# Patient Record
Sex: MALE | Race: ASIAN | NOT HISPANIC OR LATINO | Employment: FULL TIME | URBAN - METROPOLITAN AREA
[De-identification: names, ages, dates, MRNs, and addresses within clinical notes are randomized per-mention and may not be internally consistent; named-entity substitution may affect disease eponyms.]

---

## 2021-04-20 ENCOUNTER — OFFICE VISIT (OUTPATIENT)
Dept: INTERNAL MEDICINE CLINIC | Facility: CLINIC | Age: 71
End: 2021-04-20
Payer: COMMERCIAL

## 2021-04-20 VITALS
HEIGHT: 68 IN | SYSTOLIC BLOOD PRESSURE: 150 MMHG | WEIGHT: 142 LBS | HEART RATE: 80 BPM | TEMPERATURE: 98.6 F | DIASTOLIC BLOOD PRESSURE: 78 MMHG | BODY MASS INDEX: 21.52 KG/M2

## 2021-04-20 DIAGNOSIS — E11.9 TYPE 2 DIABETES MELLITUS WITHOUT COMPLICATION, WITHOUT LONG-TERM CURRENT USE OF INSULIN (HCC): Primary | ICD-10-CM

## 2021-04-20 DIAGNOSIS — E53.8 VITAMIN B12 DEFICIENCY: ICD-10-CM

## 2021-04-20 DIAGNOSIS — R13.10 DYSPHAGIA, UNSPECIFIED TYPE: ICD-10-CM

## 2021-04-20 DIAGNOSIS — E53.8 FOLIC ACID DEFICIENCY: ICD-10-CM

## 2021-04-20 DIAGNOSIS — I10 ESSENTIAL HYPERTENSION: ICD-10-CM

## 2021-04-20 DIAGNOSIS — E55.9 VITAMIN D DEFICIENCY: ICD-10-CM

## 2021-04-20 DIAGNOSIS — K63.5 POLYP OF SIGMOID COLON, UNSPECIFIED TYPE: ICD-10-CM

## 2021-04-20 DIAGNOSIS — Z12.5 SCREENING PSA (PROSTATE SPECIFIC ANTIGEN): ICD-10-CM

## 2021-04-20 PROBLEM — M83.9 VITAMIN D DEFICIENT OSTEOMALACIA: Status: ACTIVE | Noted: 2021-04-20

## 2021-04-20 PROCEDURE — 99214 OFFICE O/P EST MOD 30 MIN: CPT | Performed by: INTERNAL MEDICINE

## 2021-04-20 RX ORDER — CHOLECALCIFEROL (VITAMIN D3) 125 MCG
500 CAPSULE ORAL DAILY
COMMUNITY

## 2021-04-20 RX ORDER — MELATONIN
1000 DAILY
COMMUNITY

## 2021-04-20 RX ORDER — VITAMIN E 268 MG
400 CAPSULE ORAL DAILY
COMMUNITY
End: 2021-04-20 | Stop reason: CLARIF

## 2021-04-20 RX ORDER — MULTIVIT WITH MINERALS/LUTEIN
250 TABLET ORAL DAILY
COMMUNITY

## 2021-04-20 NOTE — ASSESSMENT & PLAN NOTE
Patient last blood test November 2018 and none since then  Patient has been watching diet  Not on any medication  Will check hemoglobin A1c and blood sugar

## 2021-04-20 NOTE — PROGRESS NOTES
Assessment/Plan:    1  Type 2 diabetes mellitus without complication, without long-term current use of insulin [facility])  Assessment & Plan:  Patient last blood test November 2018 and none since then  Patient has been watching diet  Not on any medication  Will check hemoglobin A1c and blood sugar  Orders:  -     Comprehensive metabolic panel; Future  -     Lipid panel; Future  -     Hemoglobin A1C; Future  -     TSH, 3rd generation; Future  -     UA (URINE) with reflex to Scope  -     Microalbumin / creatinine urine ratio    2  Polyp of sigmoid colon, unspecified type  Assessment & Plan:  Last colonoscopy was done March 2012 by Dr Piter Jefferson, was advised follow-up in 5 years    Orders:  -     Ambulatory referral to Gastroenterology; Future    3  Vitamin D deficiency  Comments:  His on vitamin-D supplement  Will check vitamin-D level  Orders:  -     Vitamin D 25 hydroxy; Future    4  Folic acid deficiency  Assessment & Plan:  Patient is not taking folic acid supplement  Will check folic acid level  And advise accordingly  Orders:  -     CBC and differential; Future  -     Folate; Future    5  Vitamin B12 deficiency  Assessment & Plan:  His vitamin B12 level in the past was 176  Patient has been taking vitamin-D is 12 supplement  Will check level and advise accordingly  6  Dysphagia, unspecified type  Assessment & Plan:  Patient feels sometime when he eats food does not go down easily and like it stucks below throat area, although he denies any reflux  Will refer to Gastroenterology for EGD  Orders:  -     Ambulatory referral to Gastroenterology; Future    7  BMI 21 0-21 9, adult    8  Essential hypertension  Assessment & Plan:  Patient is not on any medication  Will obtain blood tests and then decide medication for his high blood pressure  Meanwhile advised for low-salt diet  Orders:  -     CBC and differential; Future  -     Comprehensive metabolic panel; Future  -     Lipid panel;  Future  - TSH, 3rd generation; Future  -     UA (URINE) with reflex to Scope    9  Screening PSA (prostate specific antigen)  -     PSA, Total Screen; Future          Subjective:  Patient presents for follow-up  Patient ID: Meredith Hernández is a 79 y o  male  HPI   77-year-old male patient presents for follow-up of his medical problems  Last time he was seen by me at office Nov /2018  He did not have any blood test since then  Denies any chest pain, shortness with, pain abdomen  Denies any cough, fever, chills  Denies any nausea, vomiting, diarrhea  Denies any blood in the stool or dark colored stool  Complain of sometime food stuck in the throat area and does not go down easily per patient  Denies any GE reflux or heartburn  He has not take any medication for heartburn or reflux  Patient got COVID-19 vaccination  The following portions of the patient's history were reviewed and updated as appropriate:     Past Medical History:  He has a past medical history of BMI 21 0-21 9, adult (4/20/2021), Dysphagia (4/15/9128), Folic acid deficiency (4/20/2021), HTN (hypertension) (4/20/2021), Polyp of sigmoid colon (4/20/2021), Type 2 diabetes mellitus without complication, without long-term current use of insulin ([address] ) (4/20/2021), Vitamin B12 deficiency (4/20/2021), and 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)  ,  _______________________________________________________________________  Past Surgical History:   has no past surgical history on file ,  _______________________________________________________________________  Family History:  family history is not on file ,  _______________________________________________________________________  Social History:   reports that he has never smoked   He has never used smokeless tobacco  No history on file for alcohol and drug ,  _______________________________________________________________________  Allergies:  has No Known Allergies     _______________________________________________________________________  Current Outpatient Medications   Medication Sig Dispense Refill    ascorbic acid (VITAMIN C) 250 mg tablet Take 250 mg by mouth daily      cholecalciferol (VITAMIN D3) 1,000 units tablet Take 1,000 Units by mouth daily      vitamin B-12 (VITAMIN B-12) 500 mcg tablet Take 500 mcg by mouth daily       No current facility-administered medications for this visit       _______________________________________________________________________  Review of Systems   Constitutional: Negative for chills, fatigue and fever  HENT: Negative for congestion, ear pain, hearing loss, nosebleeds, sinus pain, sore throat and trouble swallowing  Eyes: Negative for discharge, redness and visual disturbance  Respiratory: Negative for cough, chest tightness and shortness of breath  Cardiovascular: Negative for chest pain and palpitations  Gastrointestinal: Negative for abdominal pain, blood in stool, constipation, diarrhea, nausea and vomiting  Genitourinary: Negative for dysuria, flank pain, frequency and hematuria  Musculoskeletal: Negative for arthralgias, myalgias and neck pain  Skin: Negative for color change and rash  Neurological: Negative for dizziness, speech difficulty, weakness and headaches  Hematological: Does not bruise/bleed easily  Psychiatric/Behavioral: Negative for agitation and behavioral problems  Objective:  Vitals:    04/20/21 1641   BP: 150/78   Pulse: 80   Temp: 98 6 °F (37 °C)   Weight: 64 4 kg (142 lb)   Height: 5' 8" (1 727 m)     Body mass index is 21 59 kg/m²  Physical Exam  Vitals signs and nursing note reviewed  Constitutional:       General: He is not in acute distress  Appearance: Normal appearance  HENT:      Head: Normocephalic and atraumatic        Right Ear: Ear canal and external ear normal       Left Ear: Ear canal and external ear normal       Nose: Nose normal  Mouth/Throat:      Mouth: Mucous membranes are moist       Pharynx: Oropharynx is clear  Comments: Patient has a face mask on  Eyes:      General: No scleral icterus  Extraocular Movements: Extraocular movements intact  Conjunctiva/sclera: Conjunctivae normal    Neck:      Musculoskeletal: Normal range of motion and neck supple  No muscular tenderness  Cardiovascular:      Rate and Rhythm: Normal rate and regular rhythm  Pulses: Normal pulses  no weak pulses          Dorsalis pedis pulses are 2+ on the right side and 2+ on the left side  Posterior tibial pulses are 2+ on the right side and 2+ on the left side  Heart sounds: No murmur  Pulmonary:      Effort: Pulmonary effort is normal       Breath sounds: Normal breath sounds  Abdominal:      General: Bowel sounds are normal       Palpations: Abdomen is soft  Tenderness: There is no abdominal tenderness  Genitourinary:     Comments: Patient declined for rectal examination  Musculoskeletal: Normal range of motion  Right lower leg: No edema  Left lower leg: No edema  Feet:      Right foot:      Skin integrity: No ulcer, skin breakdown, erythema, warmth, callus or dry skin  Left foot:      Skin integrity: No ulcer, skin breakdown, erythema, warmth, callus or dry skin  Skin:     General: Skin is warm  Findings: No rash  Neurological:      General: No focal deficit present  Mental Status: He is alert and oriented to person, place, and time  Psychiatric:         Mood and Affect: Mood normal          Behavior: Behavior normal        Right Foot/Ankle   Right Foot Inspection  Skin Exam: skin normal and skin intact no dry skin, no warmth, no callus, no erythema, no maceration, no abnormal color, no pre-ulcer, no ulcer and no callus                            Sensory   Vibration: intact  Proprioception: intact   Monofilament testing: intact  Vascular    The right DP pulse is 2+   The right PT pulse is 2+      Left Foot/Ankle  Left Foot Inspection  Skin Exam: skin normal and skin intactno dry skin, no warmth, no erythema, no maceration, normal color, no pre-ulcer, no ulcer and no callus                                         Sensory   Vibration: intact  Proprioception: intact  Monofilament: intact  Vascular    The left DP pulse is 2+  The left PT pulse is 2+  Assign Risk Category:  No deformity present; No loss of protective sensation; No weak pulses       Risk: 0  I spent 30 minutes with the patient today    More than 50% time spent for reviewing of external notes, reviewing of the results of diagnostics test, management of care, patient education and ordering of test

## 2021-04-20 NOTE — PATIENT INSTRUCTIONS
Patient advised to continue present medications discussed with the patient medications and laboratory data in detail  Follow-up with me in 3 months    If any blood test was ordered please do 1 week prior to next appointment  If you have any questions please call the office [phone]

## 2021-04-21 NOTE — ASSESSMENT & PLAN NOTE
Patient feels sometime when he eats food does not go down easily and like it stucks below throat area, although he denies any reflux  Will refer to Gastroenterology for EGD

## 2021-04-21 NOTE — ASSESSMENT & PLAN NOTE
His vitamin B12 level in the past was 176  Patient has been taking vitamin-D is 12 supplement  Will check level and advise accordingly

## 2021-04-21 NOTE — ASSESSMENT & PLAN NOTE
Patient is not on any medication  Will obtain blood tests and then decide medication for his high blood pressure  Meanwhile advised for low-salt diet

## 2021-04-26 LAB
25(OH)D3 SERPL-MCNC: 61 NG/ML (ref 30–100)
ALBUMIN SERPL-MCNC: 4 G/DL (ref 3.6–5.1)
ALBUMIN/CREAT UR: 12 MCG/MG CREAT
ALBUMIN/GLOB SERPL: 1.1 (CALC) (ref 1–2.5)
ALP SERPL-CCNC: 71 U/L (ref 35–144)
ALT SERPL-CCNC: 7 U/L (ref 9–46)
AST SERPL-CCNC: 15 U/L (ref 10–35)
BASOPHILS # BLD AUTO: 41 CELLS/UL (ref 0–200)
BASOPHILS NFR BLD AUTO: 0.7 %
BILIRUB SERPL-MCNC: 0.9 MG/DL (ref 0.2–1.2)
BUN SERPL-MCNC: 16 MG/DL (ref 7–25)
BUN/CREAT SERPL: ABNORMAL (CALC) (ref 6–22)
CALCIUM SERPL-MCNC: 8.9 MG/DL (ref 8.6–10.3)
CHLORIDE SERPL-SCNC: 106 MMOL/L (ref 98–110)
CHOLEST SERPL-MCNC: 144 MG/DL
CHOLEST/HDLC SERPL: 2.6 (CALC)
CO2 SERPL-SCNC: 26 MMOL/L (ref 20–32)
CREAT SERPL-MCNC: 0.81 MG/DL (ref 0.7–1.18)
CREAT UR-MCNC: 26 MG/DL (ref 20–320)
EOSINOPHIL # BLD AUTO: 71 CELLS/UL (ref 15–500)
EOSINOPHIL NFR BLD AUTO: 1.2 %
ERYTHROCYTE [DISTWIDTH] IN BLOOD BY AUTOMATED COUNT: 12.7 % (ref 11–15)
FOLATE SERPL-MCNC: 5.8 NG/ML
GLOBULIN SER CALC-MCNC: 3.8 G/DL (CALC) (ref 1.9–3.7)
GLUCOSE SERPL-MCNC: 106 MG/DL (ref 65–99)
HBA1C MFR BLD: 6.2 % OF TOTAL HGB
HCT VFR BLD AUTO: 39 % (ref 38.5–50)
HDLC SERPL-MCNC: 56 MG/DL
HGB BLD-MCNC: 12.4 G/DL (ref 13.2–17.1)
LDLC SERPL CALC-MCNC: 76 MG/DL (CALC)
LYMPHOCYTES # BLD AUTO: 2095 CELLS/UL (ref 850–3900)
LYMPHOCYTES NFR BLD AUTO: 35.5 %
MCH RBC QN AUTO: 25.5 PG (ref 27–33)
MCHC RBC AUTO-ENTMCNC: 31.8 G/DL (ref 32–36)
MCV RBC AUTO: 80.2 FL (ref 80–100)
MICROALBUMIN UR-MCNC: 0.3 MG/DL
MONOCYTES # BLD AUTO: 443 CELLS/UL (ref 200–950)
MONOCYTES NFR BLD AUTO: 7.5 %
NEUTROPHILS # BLD AUTO: 3251 CELLS/UL (ref 1500–7800)
NEUTROPHILS NFR BLD AUTO: 55.1 %
NONHDLC SERPL-MCNC: 88 MG/DL (CALC)
PLATELET # BLD AUTO: 192 THOUSAND/UL (ref 140–400)
PMV BLD REES-ECKER: 11.4 FL (ref 7.5–12.5)
POTASSIUM SERPL-SCNC: 4.5 MMOL/L (ref 3.5–5.3)
PROT SERPL-MCNC: 7.8 G/DL (ref 6.1–8.1)
PSA SERPL-MCNC: 5.4 NG/ML
RBC # BLD AUTO: 4.86 MILLION/UL (ref 4.2–5.8)
SL AMB EGFR AFRICAN AMERICAN: 104 ML/MIN/1.73M2
SL AMB EGFR NON AFRICAN AMERICAN: 90 ML/MIN/1.73M2
SODIUM SERPL-SCNC: 138 MMOL/L (ref 135–146)
TRIGL SERPL-MCNC: 50 MG/DL
TSH SERPL-ACNC: 2.58 MIU/L (ref 0.4–4.5)
WBC # BLD AUTO: 5.9 THOUSAND/UL (ref 3.8–10.8)

## 2021-10-25 ENCOUNTER — NEW REFERRAL (OUTPATIENT)
Dept: URBAN - METROPOLITAN AREA CLINIC 6 | Facility: CLINIC | Age: 71
End: 2021-10-25

## 2021-10-25 DIAGNOSIS — H40.1131: ICD-10-CM

## 2021-10-25 PROCEDURE — 92133 CPTRZD OPH DX IMG PST SGM ON: CPT

## 2021-10-25 PROCEDURE — 92020 GONIOSCOPY: CPT

## 2021-10-25 PROCEDURE — 1036F TOBACCO NON-USER: CPT

## 2021-10-25 PROCEDURE — G8428 CUR MEDS NOT DOCUMENT: HCPCS

## 2021-10-25 PROCEDURE — 92202 OPSCPY EXTND ON/MAC DRAW: CPT

## 2021-10-25 PROCEDURE — 92004 COMPRE OPH EXAM NEW PT 1/>: CPT

## 2021-10-25 PROCEDURE — 76514 ECHO EXAM OF EYE THICKNESS: CPT

## 2021-10-25 ASSESSMENT — VISUAL ACUITY
OU_SC: J1
OS_SC: 20/40-2
OD_SC: 20/40-2

## 2021-10-25 ASSESSMENT — TONOMETRY
OD_IOP_MMHG: 30
OS_IOP_MMHG: 25

## 2021-11-22 ENCOUNTER — IOP CHECK (OUTPATIENT)
Dept: URBAN - METROPOLITAN AREA CLINIC 6 | Facility: CLINIC | Age: 71
End: 2021-11-22

## 2021-11-22 DIAGNOSIS — H40.1131: ICD-10-CM

## 2021-11-22 PROCEDURE — 1036F TOBACCO NON-USER: CPT

## 2021-11-22 PROCEDURE — G8427 DOCREV CUR MEDS BY ELIG CLIN: HCPCS

## 2021-11-22 PROCEDURE — 92012 INTRM OPH EXAM EST PATIENT: CPT

## 2021-11-22 ASSESSMENT — VISUAL ACUITY
OS_SC: 20/40-2
OD_SC: 20/30-1

## 2021-11-22 ASSESSMENT — TONOMETRY
OS_IOP_MMHG: 23
OD_IOP_MMHG: 15

## 2022-04-18 ENCOUNTER — DOCUMENTATION (OUTPATIENT)
Dept: INTERNAL MEDICINE CLINIC | Facility: CLINIC | Age: 72
End: 2022-04-18

## 2022-04-18 NOTE — PROGRESS NOTES
Discussed with the patient that he was advised to see an urologist April 2021 due to elevated PSA but patient state he did go to see urologist yet  Has elevated blood sugar for which he has been watching diet  Patient advised to see me at office for yearly follow-up and will repeat yearly blood test including PSA

## 2022-06-07 ENCOUNTER — TELEPHONE (OUTPATIENT)
Dept: INTERNAL MEDICINE CLINIC | Facility: CLINIC | Age: 72
End: 2022-06-07

## 2022-06-07 ENCOUNTER — OFFICE VISIT (OUTPATIENT)
Dept: URGENT CARE | Facility: CLINIC | Age: 72
End: 2022-06-07
Payer: COMMERCIAL

## 2022-06-07 ENCOUNTER — APPOINTMENT (OUTPATIENT)
Dept: RADIOLOGY | Facility: CLINIC | Age: 72
End: 2022-06-07
Payer: COMMERCIAL

## 2022-06-07 VITALS
WEIGHT: 149 LBS | HEART RATE: 72 BPM | DIASTOLIC BLOOD PRESSURE: 82 MMHG | SYSTOLIC BLOOD PRESSURE: 175 MMHG | TEMPERATURE: 97.4 F | HEIGHT: 68 IN | BODY MASS INDEX: 22.58 KG/M2 | RESPIRATION RATE: 16 BRPM | OXYGEN SATURATION: 100 %

## 2022-06-07 DIAGNOSIS — D22.9 ATYPICAL NEVI: ICD-10-CM

## 2022-06-07 DIAGNOSIS — S40.022A ARM BRUISE, LEFT, INITIAL ENCOUNTER: ICD-10-CM

## 2022-06-07 DIAGNOSIS — S40.022A ARM BRUISE, LEFT, INITIAL ENCOUNTER: Primary | ICD-10-CM

## 2022-06-07 PROCEDURE — 99213 OFFICE O/P EST LOW 20 MIN: CPT | Performed by: NURSE PRACTITIONER

## 2022-06-07 PROCEDURE — 73060 X-RAY EXAM OF HUMERUS: CPT

## 2022-06-07 NOTE — TELEPHONE ENCOUNTER
He can schedule to see me in 1-2 weeks depending on schedule opening    Unless emergency can go to urgent care

## 2022-06-07 NOTE — TELEPHONE ENCOUNTER
Emma called to make apt with you - Erica Munroe had a metal plate put in his hand 10 yrs ago and now there seems to be a growth beginning over it? Just started a couple days ago  They want you to look at it  Do you want to fit him in? I asked who put the plate in and she said it was some dr in [facility] 10 yrs ago  Maybe the inf is in his chart?

## 2022-06-07 NOTE — PROGRESS NOTES
[email or web address]        NAME: Boubacar Hicks is a 67 y o  male  : 1950    MRN: [id number]  DATE: 2022  TIME: 4:20 PM    Assessment and Plan   Arm bruise, left, initial encounter [S40 022A]  1  Arm bruise, left, initial encounter  Ambulatory referral to Orthopedic Surgery    XR humerus left   2  Atypical nevi  Ambulatory referral to Dermatology     --Suspected partial tear of biceps muscle/tendon  No obvious deformity noted on exam, however  Emphasized rest, ice, other measures per below  --Emphasized importance of getting mole on back looked at ASAP  Referral information given  Patient Instructions     --Initial read of x-ray without acute findings  Intact metal plate in elbow  Will call with final radiology results when obtained (anticipate 1-12 hours)  --Rest, avoid lifting > 10 pounds for at least the next week  --Ice 3-4 times a day for the next 2-3 days, then heat  --OTC Advil as needed for pain  Alternative it OTC Voltaren gel  --Follow-up with ortho for ongoing symptoms over the next week  Go to ER for any worsening bruising, pain in the interim    --Follow-up with PCP/dermatologist regarding large black mole on back ASAP    Chief Complaint     Chief Complaint   Patient presents with    Arm Pain     Pt  C/o left upper arm pain x yesterday, large bruise on arm, no injury known  History of Present Illness       Here with complaints of left upper arm pain and bruising  First noticed yesterday  No known precipitating injury or activity, although he does lift 30-40 pound crates of beverages regularly  Pain mainly in proximal biceps region  Radiates to posterior shoulder  Pain increased with raising arm overhead  Mild weakness  No ice/heat, analgesics taken  No blood thinners  Notes past history of "plate" in left upper arm as the result of fracture (10 years ago)           Review of Systems   Review of Systems   Musculoskeletal:        Per HPI Neurological: Positive for weakness  Negative for numbness  Current Medications       Current Outpatient Medications:     ascorbic acid (VITAMIN C) 250 mg tablet, Take 250 mg by mouth daily, Disp: , Rfl:     cholecalciferol (VITAMIN D3) 1,000 units tablet, Take 1,000 Units by mouth daily, Disp: , Rfl:     vitamin B-12 (VITAMIN B-12) 500 mcg tablet, Take 500 mcg by mouth daily, Disp: , Rfl:     Current Allergies     Allergies as of 06/07/2022    (No Known Allergies)            The following portions of the patient's history were reviewed and updated as appropriate: allergies, current medications, past family history, past medical history, past social history, past surgical history and problem list      Past Medical History:   Diagnosis Date    BMI 21 0-21 9, adult 4/20/2021    Dysphagia 5/51/9445    Folic acid deficiency 5/00/9576    HTN (hypertension) 4/20/2021    Polyp of sigmoid colon 4/20/2021    Type 2 diabetes mellitus without complication, without long-term current use of insulin ([facility] [address] ) 4/20/2021    Vitamin B12 deficiency 4/20/2021    Vitamin D deficient osteomalacia 4/20/2021       Past Surgical History:   Procedure Laterality Date    COLONOSCOPY  03/13/2012    by Dr Fernie Wilson; advise for f/u colonoscopy       Family History   Problem Relation Age of Onset    Hypertension Mother     Diabetes Mother     Hypertension Father          Medications have been verified  Objective   BP (!) 175/82   Pulse 72   Temp (!) 97 4 °F (36 3 °C)   Resp 16   Ht 5' 8" (1 727 m)   Wt 67 6 kg (149 lb)   SpO2 100%   BMI 22 66 kg/m²   No LMP for male patient  Physical Exam     Physical Exam  Musculoskeletal:         General: Swelling and tenderness present  Normal range of motion  Comments: Left proximal biceps region with 6 inch long area of ecchymosis, with localized area of tenderness/firmness overlying biceps tendon/proximal musculature  No readily apparent deformity noted  Remainder of left arm, shoulder non-tender with normal appearance  Normal left shoulder and elbow AROM with some pain at limits of elbow flexion and shoulder abduction  5/5 biceps, supraspinatus, subscapularis strength  Negative Empty Can  Skin:     Findings: Lesion present  Comments: Left mid back with 1 cm diameter, fairly well circumcised, uniformly colored black nevus  Neurological:      Mental Status: He is alert

## 2022-06-07 NOTE — PATIENT INSTRUCTIONS
--Initial read of x-ray without acute findings  Intact metal plate in elbow  Will call with final radiology results when obtained (anticipate 1-12 hours)  --Rest, avoid lifting > 10 pounds for at least the next week  --Ice 3-4 times a day for the next 2-3 days, then heat  --OTC Advil as needed for pain  Alternative it OTC Voltaren gel  --Follow-up with ortho for ongoing symptoms over the next week    Go to ER for any worsening bruising, pain in the interim    --Follow-up with PCP/dermatologist regarding large black mole on back

## 2022-07-21 ENCOUNTER — IOP CHECK (OUTPATIENT)
Dept: URBAN - METROPOLITAN AREA CLINIC 6 | Facility: CLINIC | Age: 72
End: 2022-07-21

## 2022-07-21 DIAGNOSIS — H40.1131: ICD-10-CM

## 2022-07-21 DIAGNOSIS — Z96.1: ICD-10-CM

## 2022-07-21 PROCEDURE — 92083 EXTENDED VISUAL FIELD XM: CPT

## 2022-07-21 PROCEDURE — 92250 FUNDUS PHOTOGRAPHY W/I&R: CPT

## 2022-07-21 PROCEDURE — 92012 INTRM OPH EXAM EST PATIENT: CPT

## 2022-07-21 ASSESSMENT — VISUAL ACUITY
OD_PH: 20/40-1
OS_SC: 20/50+1
OS_PH: 20/30
OD_SC: 20/50+1
OU_SC: J5

## 2022-07-21 ASSESSMENT — TONOMETRY
OD_IOP_MMHG: 22
OS_IOP_MMHG: 17

## 2022-11-17 ENCOUNTER — VBI (OUTPATIENT)
Dept: ADMINISTRATIVE | Facility: OTHER | Age: 72
End: 2022-11-17

## 2023-01-23 ENCOUNTER — ESTABLISHED COMPREHENSIVE EXAM (OUTPATIENT)
Dept: URBAN - METROPOLITAN AREA CLINIC 6 | Facility: CLINIC | Age: 73
End: 2023-01-23

## 2023-01-23 DIAGNOSIS — Z96.1: ICD-10-CM

## 2023-01-23 DIAGNOSIS — R73.09: ICD-10-CM

## 2023-01-23 DIAGNOSIS — H40.1131: ICD-10-CM

## 2023-01-23 PROCEDURE — 92020 GONIOSCOPY: CPT

## 2023-01-23 PROCEDURE — 92202 OPSCPY EXTND ON/MAC DRAW: CPT

## 2023-01-23 PROCEDURE — 92133 CPTRZD OPH DX IMG PST SGM ON: CPT

## 2023-01-23 PROCEDURE — 92014 COMPRE OPH EXAM EST PT 1/>: CPT

## 2023-01-23 ASSESSMENT — TONOMETRY
OS_IOP_MMHG: 17
OD_IOP_MMHG: 15

## 2023-01-23 ASSESSMENT — VISUAL ACUITY
OU_SC: J2-2
OD_SC: 20/30-2
OS_SC: 20/30-2

## 2024-08-07 ENCOUNTER — OFFICE VISIT (OUTPATIENT)
Dept: INTERNAL MEDICINE CLINIC | Facility: CLINIC | Age: 74
End: 2024-08-07
Payer: COMMERCIAL

## 2024-08-07 VITALS
OXYGEN SATURATION: 99 % | HEIGHT: 68 IN | HEART RATE: 67 BPM | WEIGHT: 148 LBS | RESPIRATION RATE: 18 BRPM | DIASTOLIC BLOOD PRESSURE: 80 MMHG | SYSTOLIC BLOOD PRESSURE: 158 MMHG | BODY MASS INDEX: 22.43 KG/M2 | TEMPERATURE: 98.2 F

## 2024-08-07 DIAGNOSIS — K63.5 POLYP OF SIGMOID COLON, UNSPECIFIED TYPE: ICD-10-CM

## 2024-08-07 DIAGNOSIS — E55.9 VITAMIN D DEFICIENCY: ICD-10-CM

## 2024-08-07 DIAGNOSIS — R13.19 OTHER DYSPHAGIA: ICD-10-CM

## 2024-08-07 DIAGNOSIS — Z12.5 SCREENING PSA (PROSTATE SPECIFIC ANTIGEN): ICD-10-CM

## 2024-08-07 DIAGNOSIS — E11.9 TYPE 2 DIABETES MELLITUS WITHOUT COMPLICATION, WITHOUT LONG-TERM CURRENT USE OF INSULIN (HCC): ICD-10-CM

## 2024-08-07 DIAGNOSIS — E53.8 FOLIC ACID DEFICIENCY: ICD-10-CM

## 2024-08-07 DIAGNOSIS — K21.9 LARYNGOPHARYNGEAL REFLUX (LPR): ICD-10-CM

## 2024-08-07 DIAGNOSIS — R97.20 ELEVATED PSA: ICD-10-CM

## 2024-08-07 DIAGNOSIS — D53.9 DEFICIENCY ANEMIA: ICD-10-CM

## 2024-08-07 DIAGNOSIS — E53.8 VITAMIN B12 DEFICIENCY: ICD-10-CM

## 2024-08-07 DIAGNOSIS — I10 PRIMARY HYPERTENSION: ICD-10-CM

## 2024-08-07 DIAGNOSIS — Z00.00 MEDICARE ANNUAL WELLNESS VISIT, SUBSEQUENT: Primary | ICD-10-CM

## 2024-08-07 PROBLEM — M83.9 VITAMIN D DEFICIENT OSTEOMALACIA: Status: RESOLVED | Noted: 2021-04-20 | Resolved: 2024-08-07

## 2024-08-07 PROCEDURE — G0439 PPPS, SUBSEQ VISIT: HCPCS | Performed by: INTERNAL MEDICINE

## 2024-08-07 PROCEDURE — 99214 OFFICE O/P EST MOD 30 MIN: CPT | Performed by: INTERNAL MEDICINE

## 2024-08-07 RX ORDER — LOSARTAN POTASSIUM 50 MG/1
50 TABLET ORAL DAILY
Qty: 30 TABLET | Refills: 1 | Status: SHIPPED | OUTPATIENT
Start: 2024-08-07

## 2024-08-07 RX ORDER — OMEPRAZOLE 40 MG/1
40 CAPSULE, DELAYED RELEASE ORAL
Qty: 30 CAPSULE | Refills: 2 | Status: SHIPPED | OUTPATIENT
Start: 2024-08-07 | End: 2025-02-03

## 2024-08-07 NOTE — PROGRESS NOTES
Ambulatory Visit  Name: Marin Blancas      : 1950      MRN: [id number]  Encounter Provider: Jimi Huertas MD  Encounter Date: 2024   Encounter department: [facility] INTERNAL MEDICINE    Assessment & Plan   1. Medicare annual wellness visit, subsequent  -     Lipid panel; Future  2. Primary hypertension  -     losartan (COZAAR) 50 mg tablet; Take 1 tablet (50 mg total) by mouth daily  -     CBC and differential; Future  -     Comprehensive metabolic panel; Future  -     UA (URINE) with reflex to Scope; Future  -     TSH, 3rd generation; Future  -     Lipid panel; Future  3. Other dysphagia  -     omeprazole (PriLOSEC) 40 MG capsule; Take 1 capsule (40 mg total) by mouth daily before breakfast  -     Ambulatory Referral to Gastroenterology; Future  -     Comprehensive metabolic panel; Future  4. Polyp of sigmoid colon, unspecified type  -     Ambulatory Referral to Gastroenterology; Future  5. Laryngopharyngeal reflux (LPR)  -     omeprazole (PriLOSEC) 40 MG capsule; Take 1 capsule (40 mg total) by mouth daily before breakfast  -     Ambulatory Referral to Gastroenterology; Future  6. Vitamin B12 deficiency  -     CBC and differential; Future  -     Vitamin B12; Future  7. Vitamin D deficiency  -     Comprehensive metabolic panel; Future  -     Vitamin D 25 hydroxy; Future  8. Folic acid deficiency  -     CBC and differential; Future  -     Folate; Future  9. Type 2 diabetes mellitus without complication, without long-term current use of insulin (HCC)  -     Albumin / creatinine urine ratio; Future  -     Comprehensive metabolic panel; Future  -     Hemoglobin A1C; Future  -     Lipid panel; Future  10. Elevated PSA  -     PSA, total and free; Future  -     Ambulatory Referral to Urology; Future  11. Deficiency anemia  -     CBC and differential; Future  -     Folate; Future  -     Vitamin B12; Future  -     UA (URINE) with reflex to Scope; Future  -     TSH, 3rd generation;  Future  -     Iron Panel (Includes Ferritin, Iron Sat%, Iron, and TIBC); Future  -     Protein electrophoresis, serum; Future  12. Screening PSA (prostate specific antigen)  -     PSA, total and free; Future  13. BMI 22.0-22.9, adult     Discussion/summary/plan:    Blood pressure elevated.  Discussed with the patient about starting medication for hypertension he agreed/started on losartan 50 mg daily.  Advised for low-salt diet.  He has a some difficulty in swallowing the food and some time medicine tablet.  He was seen by ENT specialist was diagnosed with laryngal pharyngeal reflux and was started omeprazole 20 mg daily which she took for 1 month.  Slightly improved but still has a persistent symptom.  Discussed with the patient needs EGD patient agreed so will refer to GI meanwhile I increased dose upon the result 20 to 40 mg daily.  Advised for GE reflux precautions as well as diet.  He has a vitamin B12 deficiency for he takes vitamin B12 supplement will follow vitamin B12 every day and will advise accordingly.  He has a vitamin D deficiency he takes vitamin D supplement.  Will follow vitamin D level and advise accordingly.  He has a history of folic acid deficiency in the past we will check folic acid level and advise accordingly.  He has history of diabetes mellitus diet controlled.  Last hemoglobin A1c 6.2.  He has been watching diet very closely.  Will follow A1c and advise accordingly.  He had elevated PSA last time 5.4 he was advised to see an urologist but he did not go to see urologist.  Discussed with the patient getting a rectal examination but he will consider having it done by specialist.  Will refer to urologist.  Will check total and free PSA.  His last hemoglobin was 12.4.  He denies any blood in the stool or dark-colored stool.  Will check anemia workup and as mentioned above will refer to EGD and also he needs colonoscopy patient agreed for colonoscopy as well so will refer to colonoscopy.  Has  history of colon polyp.  Preventive health issues were discussed with patient, and age appropriate screening tests were ordered as noted in patient's After Visit Summary. Personalized health advice and appropriate referrals for health education or preventive services given if needed, as noted in patient's After Visit Summary.          HPI   74-year-old male patient presents for annual wellness exam as well as follow-up his medical problems.    Patient Care Team:  Jimi Huertas MD as PCP - [facility] ([facility])    Review of Systems   Constitutional:  Negative for chills, fatigue and fever.   HENT:  Negative for congestion, ear pain, hearing loss, nosebleeds, sinus pain, sore throat and trouble swallowing.    Eyes:  Negative for discharge, redness and visual disturbance.   Respiratory:  Negative for cough, chest tightness and shortness of breath.    Cardiovascular:  Negative for chest pain and palpitations.   Gastrointestinal:  Negative for abdominal pain, blood in stool, constipation, diarrhea, nausea and vomiting.   Genitourinary:  Negative for dysuria, flank pain, frequency and hematuria.   Musculoskeletal:  Negative for arthralgias, myalgias and neck pain.   Skin:  Negative for color change and rash.   Neurological:  Negative for dizziness, speech difficulty, weakness and headaches.   Hematological:  Does not bruise/bleed easily.   Psychiatric/Behavioral:  Negative for agitation and behavioral problems.        Medical History Reviewed by provider this encounter:  Tobacco       Annual Wellness Visit Questionnaire   Marin is here for his Subsequent Wellness visit. Last Medicare Wellness visit information reviewed, patient interviewed and updates made to the record today.      Health Risk Assessment:   Patient rates overall health as excellent. Patient feels that their physical health rating is much better. Patient is satisfied with their life. Eyesight was rated as same. Hearing was rated  as same. Patient feels that their emotional and mental health rating is slightly better. Patients states they are never, rarely angry. Patient states they are never, rarely unusually tired/fatigued. Pain experienced in the last 7 days has been none. Patient states that he has experienced no weight loss or gain in last 6 months.     Depression Screening:   PHQ-2 Score: 0      Fall Risk Screening:   In the past year, patient has experienced: no history of falling in past year      Home Safety:  Patient does not have trouble with stairs inside or outside of their home. Patient has working smoke alarms and has no working carbon monoxide detector. Home safety hazards include: none.     Nutrition:   Current diet is Regular and Frequent junk food.     Medications:   Patient is currently taking over-the-counter supplements. OTC medications include: see medication list. Patient is able to manage medications.     Activities of Daily Living (ADLs)/Instrumental Activities of Daily Living (IADLs):   Walk and transfer into and out of bed and chair?: Yes  Dress and groom yourself?: Yes    Bathe or shower yourself?: Yes    Feed yourself? Yes  Do your laundry/housekeeping?: Yes  Manage your money, pay your bills and track your expenses?: Yes  Make your own meals?: Yes    Do your own shopping?: Yes    Previous Hospitalizations:   Any hospitalizations or ED visits within the last 12 months?: No      Advance Care Planning:   Living will: No    Durable POA for healthcare: No    Advanced directive: No    Advanced directive counseling given: Yes    ACP document given: Yes    Patient declined ACP directive: No      Cognitive Screening:   Provider or family/friend/caregiver concerned regarding cognition?: No    PREVENTIVE SCREENINGS      Cardiovascular Screening:    General: Screening Current    Due for: Lipid Panel      Diabetes Screening:     General: History Diabetes and Screening Current    Due for: Blood Glucose      Colorectal Cancer  Screening:       Due for: Colonoscopy - Low Risk      Prostate Cancer Screening:      Due for: PSA      Osteoporosis Screening:    General: Risks and Benefits Discussed      Abdominal Aortic Aneurysm (AAA) Screening:    Risk factors include: age between 65-76 yo        General: Screening Not Indicated      Lung Cancer Screening:     General: Screening Not Indicated      Hepatitis C Screening:    General: Screening Not Indicated    Screening, Brief Intervention, and Referral to Treatment (SBIRT)    Screening  Typical number of drinks in a day: 0  Typical number of drinks in a week: 0  Interpretation: Low risk drinking behavior.    Single Item Drug Screening:  How often have you used an illegal drug (including marijuana) or a prescription medication for non-medical reasons in the past year? never    Single Item Drug Screen Score: 0  Interpretation: Negative screen for possible drug use disorder    Brief Intervention  Alcohol & drug use screenings were reviewed. No concerns regarding substance use disorder identified.     Other Counseling Topics:   Car/seat belt/driving safety, skin self-exam, sunscreen and calcium and vitamin D intake and regular weightbearing exercise.     Social Determinants of Health     Food Insecurity: No Food Insecurity (8/7/2024)    Hunger Vital Sign    • Worried About Running Out of Food in the Last Year: Never true    • Ran Out of Food in the Last Year: Never true   Transportation Needs: No Transportation Needs (8/7/2024)    PRAPARE - Transportation    • Lack of Transportation (Medical): No    • Lack of Transportation (Non-Medical): No   Housing Stability: Low Risk  (8/7/2024)    Housing Stability Vital Sign    • Unable to Pay for Housing in the Last Year: No    • Number of Times Moved in the Last Year: 0    • Homeless in the Last Year: No   Utilities: Not At Risk (8/7/2024)    [facility] Utilities    • Threatened with loss of utilities: No     No results found.    Objective     /80 (BP  "Location: Left arm, Patient Position: Sitting, Cuff Size: Large)   Pulse 67   Temp 98.2 °F (36.8 °C) (Temporal)   Resp 18   Ht 5' 8\" (1.727 m)   Wt 67.1 kg (148 lb)   SpO2 99%   BMI 22.50 kg/m²     Physical Exam  Vitals and nursing note reviewed.   Constitutional:       General: He is not in acute distress.     Appearance: Normal appearance. He is well-developed.   HENT:      Head: Normocephalic and atraumatic.      Right Ear: Tympanic membrane, ear canal and external ear normal. There is no impacted cerumen.      Left Ear: Tympanic membrane, ear canal and external ear normal. There is no impacted cerumen.      Nose: Nose normal.      Mouth/Throat:      Mouth: Mucous membranes are moist.      Pharynx: Oropharynx is clear.   Eyes:      General: No scleral icterus.        Right eye: No discharge.         Left eye: No discharge.      Extraocular Movements: Extraocular movements intact.      Conjunctiva/sclera: Conjunctivae normal.      Pupils: Pupils are equal, round, and reactive to light.   Cardiovascular:      Rate and Rhythm: Normal rate and regular rhythm.      Pulses: Normal pulses.      Heart sounds: Normal heart sounds. No murmur heard.  Pulmonary:      Effort: Pulmonary effort is normal. No respiratory distress.      Breath sounds: Normal breath sounds. No wheezing, rhonchi or rales.   Abdominal:      General: Bowel sounds are normal. There is no distension.      Palpations: Abdomen is soft.      Tenderness: There is no abdominal tenderness.   Genitourinary:     Comments: Patient declined  Musculoskeletal:         General: No swelling. Normal range of motion.      Cervical back: Normal range of motion and neck supple.      Right lower leg: No edema.      Left lower leg: No edema.   Skin:     General: Skin is warm and dry.      Capillary Refill: Capillary refill takes less than 2 seconds.      Findings: No rash.   Neurological:      General: No focal deficit present.      Mental Status: He is alert and " oriented to person, place, and time.      Motor: No weakness.      Coordination: Coordination normal.   Psychiatric:         Mood and Affect: Mood normal.         Behavior: Behavior normal.

## 2024-08-07 NOTE — PATIENT INSTRUCTIONS
Patient was advised to continue present medications. discussed with the patient medications and laboratory data in detail.  Follow-up with me in 3 weeks or as advised.  If any blood test was ordered please do 1 week prior to next appointment unless advise to get earlier.  If you have any questions please call the office [phone]

## 2024-08-14 LAB
25(OH)D3 SERPL-MCNC: 91 NG/ML (ref 30–100)
ALBUMIN SERPL ELPH-MCNC: 4.3 G/DL (ref 3.8–4.8)
ALBUMIN SERPL-MCNC: 4.3 G/DL (ref 3.6–5.1)
ALBUMIN/CREAT UR: 16 MG/G CREAT
ALBUMIN/GLOB SERPL: 1 (CALC) (ref 1–2.5)
ALP SERPL-CCNC: 74 U/L (ref 35–144)
ALPHA1 GLOB SERPL ELPH-MCNC: 0.3 G/DL (ref 0.2–0.3)
ALPHA2 GLOB SERPL ELPH-MCNC: 0.5 G/DL (ref 0.5–0.9)
ALT SERPL-CCNC: 6 U/L (ref 9–46)
APPEARANCE UR: CLEAR
AST SERPL-CCNC: 14 U/L (ref 10–35)
BASOPHILS # BLD AUTO: 29 CELLS/UL (ref 0–200)
BASOPHILS NFR BLD AUTO: 0.6 %
BETA1 GLOB SERPL ELPH-MCNC: 0.4 G/DL (ref 0.4–0.6)
BETA2 GLOB SERPL ELPH-MCNC: 0.2 G/DL (ref 0.2–0.5)
BILIRUB SERPL-MCNC: 1.2 MG/DL (ref 0.2–1.2)
BILIRUB UR QL STRIP: NEGATIVE
BUN SERPL-MCNC: 13 MG/DL (ref 7–25)
BUN/CREAT SERPL: ABNORMAL (CALC) (ref 6–22)
CALCIUM SERPL-MCNC: 9.3 MG/DL (ref 8.6–10.3)
CHLORIDE SERPL-SCNC: 103 MMOL/L (ref 98–110)
CHOLEST SERPL-MCNC: 150 MG/DL
CHOLEST/HDLC SERPL: 2.8 (CALC)
CO2 SERPL-SCNC: 29 MMOL/L (ref 20–32)
COLOR UR: YELLOW
CREAT SERPL-MCNC: 0.85 MG/DL (ref 0.7–1.28)
CREAT UR-MCNC: 91 MG/DL (ref 20–320)
EOSINOPHIL # BLD AUTO: 142 CELLS/UL (ref 15–500)
EOSINOPHIL NFR BLD AUTO: 2.9 %
ERYTHROCYTE [DISTWIDTH] IN BLOOD BY AUTOMATED COUNT: 12.3 % (ref 11–15)
FERRITIN SERPL-MCNC: 30 NG/ML (ref 24–380)
FOLATE SERPL-MCNC: 7.9 NG/ML
GAMMA GLOB SERPL ELPH-MCNC: 2.7 G/DL (ref 0.8–1.7)
GFR/BSA.PRED SERPLBLD CYS-BASED-ARV: 91 ML/MIN/1.73M2
GLOBULIN SER CALC-MCNC: 4.2 G/DL (CALC) (ref 1.9–3.7)
GLUCOSE SERPL-MCNC: 133 MG/DL (ref 65–99)
GLUCOSE UR QL STRIP: NEGATIVE
HBA1C MFR BLD: 6.9 % OF TOTAL HGB
HCT VFR BLD AUTO: 42.1 % (ref 38.5–50)
HDLC SERPL-MCNC: 54 MG/DL
HGB BLD-MCNC: 13.5 G/DL (ref 13.2–17.1)
HGB UR QL STRIP: NEGATIVE
IRON SATN MFR SERPL: 29 % (CALC) (ref 20–48)
IRON SERPL-MCNC: 101 MCG/DL (ref 50–180)
KETONES UR QL STRIP: NEGATIVE
LDLC SERPL CALC-MCNC: 82 MG/DL (CALC)
LEUKOCYTE ESTERASE UR QL STRIP: NEGATIVE
LYMPHOCYTES # BLD AUTO: 1656 CELLS/UL (ref 850–3900)
LYMPHOCYTES NFR BLD AUTO: 33.8 %
M PROTEIN 1 SERPL ELPH-MCNC: 2.1 G/DL
MCH RBC QN AUTO: 26.4 PG (ref 27–33)
MCHC RBC AUTO-ENTMCNC: 32.1 G/DL (ref 32–36)
MCV RBC AUTO: 82.4 FL (ref 80–100)
MICROALBUMIN UR-MCNC: 1.5 MG/DL
MONOCYTES # BLD AUTO: 358 CELLS/UL (ref 200–950)
MONOCYTES NFR BLD AUTO: 7.3 %
NEUTROPHILS # BLD AUTO: 2715 CELLS/UL (ref 1500–7800)
NEUTROPHILS NFR BLD AUTO: 55.4 %
NITRITE UR QL STRIP: NEGATIVE
NONHDLC SERPL-MCNC: 96 MG/DL (CALC)
PH UR STRIP: 8 [PH] (ref 5–8)
PLATELET # BLD AUTO: 184 THOUSAND/UL (ref 140–400)
PMV BLD REES-ECKER: 11.4 FL (ref 7.5–12.5)
POTASSIUM SERPL-SCNC: 4.7 MMOL/L (ref 3.5–5.3)
PROT SERPL-MCNC: 8.5 G/DL (ref 6.1–8.1)
PROT SERPL-MCNC: 8.5 G/DL (ref 6.1–8.1)
PROT UR QL STRIP: NEGATIVE
PSA FREE MFR SERPL: 30 % (CALC)
PSA FREE SERPL-MCNC: 1.8 NG/ML
PSA SERPL-MCNC: 6 NG/ML
RBC # BLD AUTO: 5.11 MILLION/UL (ref 4.2–5.8)
REF LAB TEST NAME: NORMAL
REF LAB TEST: NORMAL
SL AMB CLIENT CONTACT: NORMAL
SODIUM SERPL-SCNC: 137 MMOL/L (ref 135–146)
SP GR UR STRIP: 1.01 (ref 1–1.03)
TIBC SERPL-MCNC: 352 MCG/DL (CALC) (ref 250–425)
TRIGL SERPL-MCNC: 59 MG/DL
TSH SERPL-ACNC: 3.79 MIU/L (ref 0.4–4.5)
VIT B12 SERPL-MCNC: >2000 PG/ML (ref 200–1100)
WBC # BLD AUTO: 4.9 THOUSAND/UL (ref 3.8–10.8)

## 2024-08-19 LAB
25(OH)D3 SERPL-MCNC: 91 NG/ML (ref 30–100)
ALBUMIN SERPL ELPH-MCNC: 4.3 G/DL (ref 3.8–4.8)
ALBUMIN SERPL-MCNC: 4.3 G/DL (ref 3.6–5.1)
ALBUMIN/CREAT UR: 16 MG/G CREAT
ALBUMIN/GLOB SERPL: 1 (CALC) (ref 1–2.5)
ALP SERPL-CCNC: 74 U/L (ref 35–144)
ALPHA1 GLOB SERPL ELPH-MCNC: 0.3 G/DL (ref 0.2–0.3)
ALPHA2 GLOB SERPL ELPH-MCNC: 0.5 G/DL (ref 0.5–0.9)
ALT SERPL-CCNC: 6 U/L (ref 9–46)
APPEARANCE UR: CLEAR
AST SERPL-CCNC: 14 U/L (ref 10–35)
BASOPHILS # BLD AUTO: 29 CELLS/UL (ref 0–200)
BASOPHILS NFR BLD AUTO: 0.6 %
BETA1 GLOB SERPL ELPH-MCNC: 0.4 G/DL (ref 0.4–0.6)
BETA2 GLOB SERPL ELPH-MCNC: 0.2 G/DL (ref 0.2–0.5)
BILIRUB SERPL-MCNC: 1.2 MG/DL (ref 0.2–1.2)
BILIRUB UR QL STRIP: NEGATIVE
BUN SERPL-MCNC: 13 MG/DL (ref 7–25)
BUN/CREAT SERPL: ABNORMAL (CALC) (ref 6–22)
CALCIUM SERPL-MCNC: 9.3 MG/DL (ref 8.6–10.3)
CHLORIDE SERPL-SCNC: 103 MMOL/L (ref 98–110)
CHOLEST SERPL-MCNC: 150 MG/DL
CHOLEST/HDLC SERPL: 2.8 (CALC)
CO2 SERPL-SCNC: 29 MMOL/L (ref 20–32)
COLOR UR: YELLOW
CREAT SERPL-MCNC: 0.85 MG/DL (ref 0.7–1.28)
CREAT UR-MCNC: 91 MG/DL (ref 20–320)
EOSINOPHIL # BLD AUTO: 142 CELLS/UL (ref 15–500)
EOSINOPHIL NFR BLD AUTO: 2.9 %
ERYTHROCYTE [DISTWIDTH] IN BLOOD BY AUTOMATED COUNT: 12.3 % (ref 11–15)
FERRITIN SERPL-MCNC: 30 NG/ML (ref 24–380)
FOLATE SERPL-MCNC: 7.9 NG/ML
GAMMA GLOB SERPL ELPH-MCNC: 2.7 G/DL (ref 0.8–1.7)
GFR/BSA.PRED SERPLBLD CYS-BASED-ARV: 91 ML/MIN/1.73M2
GLOBULIN SER CALC-MCNC: 4.2 G/DL (CALC) (ref 1.9–3.7)
GLUCOSE SERPL-MCNC: 133 MG/DL (ref 65–99)
GLUCOSE UR QL STRIP: NEGATIVE
HBA1C MFR BLD: 6.9 % OF TOTAL HGB
HCT VFR BLD AUTO: 42.1 % (ref 38.5–50)
HDLC SERPL-MCNC: 54 MG/DL
HGB BLD-MCNC: 13.5 G/DL (ref 13.2–17.1)
HGB UR QL STRIP: NEGATIVE
INTERPRETATION SERPL IFE-IMP: NORMAL
IRON SATN MFR SERPL: 29 % (CALC) (ref 20–48)
IRON SERPL-MCNC: 101 MCG/DL (ref 50–180)
KETONES UR QL STRIP: NEGATIVE
LDLC SERPL CALC-MCNC: 82 MG/DL (CALC)
LEUKOCYTE ESTERASE UR QL STRIP: NEGATIVE
LYMPHOCYTES # BLD AUTO: 1656 CELLS/UL (ref 850–3900)
LYMPHOCYTES NFR BLD AUTO: 33.8 %
M PROTEIN 1 SERPL ELPH-MCNC: 2.1 G/DL
MCH RBC QN AUTO: 26.4 PG (ref 27–33)
MCHC RBC AUTO-ENTMCNC: 32.1 G/DL (ref 32–36)
MCV RBC AUTO: 82.4 FL (ref 80–100)
MICROALBUMIN UR-MCNC: 1.5 MG/DL
MONOCYTES # BLD AUTO: 358 CELLS/UL (ref 200–950)
MONOCYTES NFR BLD AUTO: 7.3 %
NEUTROPHILS # BLD AUTO: 2715 CELLS/UL (ref 1500–7800)
NEUTROPHILS NFR BLD AUTO: 55.4 %
NITRITE UR QL STRIP: NEGATIVE
NONHDLC SERPL-MCNC: 96 MG/DL (CALC)
PH UR STRIP: 8 [PH] (ref 5–8)
PLATELET # BLD AUTO: 184 THOUSAND/UL (ref 140–400)
PMV BLD REES-ECKER: 11.4 FL (ref 7.5–12.5)
POTASSIUM SERPL-SCNC: 4.7 MMOL/L (ref 3.5–5.3)
PROT SERPL-MCNC: 8.5 G/DL (ref 6.1–8.1)
PROT SERPL-MCNC: 8.5 G/DL (ref 6.1–8.1)
PROT UR QL STRIP: NEGATIVE
PSA FREE MFR SERPL: 30 % (CALC)
PSA FREE SERPL-MCNC: 1.8 NG/ML
PSA SERPL-MCNC: 6 NG/ML
RBC # BLD AUTO: 5.11 MILLION/UL (ref 4.2–5.8)
REF LAB TEST NAME: NORMAL
REF LAB TEST: NORMAL
SL AMB CLIENT CONTACT: NORMAL
SODIUM SERPL-SCNC: 137 MMOL/L (ref 135–146)
SP GR UR STRIP: 1.01 (ref 1–1.03)
TIBC SERPL-MCNC: 352 MCG/DL (CALC) (ref 250–425)
TRIGL SERPL-MCNC: 59 MG/DL
TSH SERPL-ACNC: 3.79 MIU/L (ref 0.4–4.5)
VIT B12 SERPL-MCNC: >2000 PG/ML (ref 200–1100)
WBC # BLD AUTO: 4.9 THOUSAND/UL (ref 3.8–10.8)

## 2024-08-27 ENCOUNTER — TELEPHONE (OUTPATIENT)
Dept: FAMILY MEDICINE CLINIC | Facility: CLINIC | Age: 74
End: 2024-08-27

## 2024-08-27 DIAGNOSIS — R77.8 ABNORMAL SPEP: Primary | ICD-10-CM

## 2024-08-27 NOTE — TELEPHONE ENCOUNTER
Per Dr. Huertas- Called patient and provided phone number for Dr. Mays- referral in patient's chart. LVM with contact information.

## 2024-09-06 PROBLEM — Z00.00 MEDICARE ANNUAL WELLNESS VISIT, SUBSEQUENT: Status: RESOLVED | Noted: 2024-08-07 | Resolved: 2024-09-06

## 2024-09-20 ENCOUNTER — TELEPHONE (OUTPATIENT)
Age: 74
End: 2024-09-20

## 2024-09-20 NOTE — TELEPHONE ENCOUNTER
Feli from Dr. Babcock's office called in requesting if our office can fax over the pt's most recent PSA results and previous PSA results, if possible.    Fax #: [phone]

## 2024-10-03 ENCOUNTER — TELEPHONE (OUTPATIENT)
Dept: GASTROENTEROLOGY | Facility: CLINIC | Age: 74
End: 2024-10-03

## 2024-10-03 NOTE — TELEPHONE ENCOUNTER
Dr Giang's schedule has changed on 10/14/24 and pt's appt needs to be reschedule.  Please put call through to me when pt calls back as CM informed of scheduling same date 10/14/24 (overbooking).  Will call pt again if do not hear back from pt.

## 2024-10-14 ENCOUNTER — PREP FOR PROCEDURE (OUTPATIENT)
Dept: GASTROENTEROLOGY | Facility: CLINIC | Age: 74
End: 2024-10-14

## 2024-10-14 ENCOUNTER — OFFICE VISIT (OUTPATIENT)
Dept: GASTROENTEROLOGY | Facility: CLINIC | Age: 74
End: 2024-10-14
Payer: COMMERCIAL

## 2024-10-14 ENCOUNTER — TELEPHONE (OUTPATIENT)
Dept: GASTROENTEROLOGY | Facility: CLINIC | Age: 74
End: 2024-10-14

## 2024-10-14 VITALS
HEIGHT: 68 IN | SYSTOLIC BLOOD PRESSURE: 165 MMHG | DIASTOLIC BLOOD PRESSURE: 87 MMHG | HEART RATE: 66 BPM | WEIGHT: 144.2 LBS | BODY MASS INDEX: 21.86 KG/M2

## 2024-10-14 DIAGNOSIS — R13.14 PHARYNGOESOPHAGEAL DYSPHAGIA: Primary | ICD-10-CM

## 2024-10-14 DIAGNOSIS — Z12.11 SCREENING FOR COLON CANCER: ICD-10-CM

## 2024-10-14 DIAGNOSIS — K21.9 LARYNGOPHARYNGEAL REFLUX (LPR): ICD-10-CM

## 2024-10-14 DIAGNOSIS — K63.5 POLYP OF SIGMOID COLON, UNSPECIFIED TYPE: ICD-10-CM

## 2024-10-14 PROCEDURE — 99204 OFFICE O/P NEW MOD 45 MIN: CPT | Performed by: INTERNAL MEDICINE

## 2024-10-14 RX ORDER — VITAMIN B COMPLEX
1 TABLET ORAL
COMMUNITY

## 2024-10-14 RX ORDER — POLYETHYLENE GLYCOL 3350, SODIUM SULFATE ANHYDROUS, SODIUM BICARBONATE, SODIUM CHLORIDE, POTASSIUM CHLORIDE 236; 22.74; 6.74; 5.86; 2.97 G/4L; G/4L; G/4L; G/4L; G/4L
4 POWDER, FOR SOLUTION ORAL ONCE
Qty: 4000 ML | Refills: 0 | Status: SHIPPED | OUTPATIENT
Start: 2024-10-14 | End: 2024-10-14

## 2024-10-14 NOTE — H&P (VIEW-ONLY)
Ambulatory Visit  Name: Marin Blancas      : 1950      MRN: [id number]  Encounter Provider: Perfecto Giang MD  Encounter Date: 10/14/2024   Encounter department: [facility]OLOGY [address] [facility]    Assessment & Plan  Pharyngoesophageal dysphagia  Patient complains of difficulty swallowing food sticking in the upper mid chest, bougie dilatation and more than 10 years ago resolved his symptoms until few weeks ago.  No weight loss GI bleeding, antireflux measure reviewed diet discussed, denies excessive reflux, schedule EGD possible dilatation patient agrees procedure discussed with anesthesia and he consents.  Orders:    Ambulatory Referral to Gastroenterology    EGD; Future    Polyp of sigmoid colon, unspecified type    Orders:    Ambulatory Referral to Gastroenterology    Laryngopharyngeal reflux (LPR)    Orders:    Ambulatory Referral to Gastroenterology    Screening for colon cancer  Last colonoscopy more than 10 years ago, screening colonoscopies discussed with the patient prep reviewed, he agreed in the office, he called after he left the office half an hour later by the time I was discussing this note and wanted to hold off colonoscopy.  He was made aware that he is due for colonoscopy as well as his medical doctor recommends but he wants to cancel for now.  Orders:    Colonoscopy; Future    polyethylene glycol (Golytely) 4000 mL solution; Take 4,000 mL by mouth once for 1 dose Take according to instructions given by the office for colonoscopy bowel prep.      History of Present Illness     Marin Blancas is a 74 y.o. male who presents patient came in for evaluation of his difficulty swallowing, food sticking in the upper chest, he had similar symptoms 12 years ago had EGD with bougie dilatation done and the symptoms resolved and have not had symptoms until few weeks ago.  Has not lost any weight recently, denies any vomiting GI bleeding melena hematochezia bowels are generally  "regular no apparent blood in stools.  Denies any family history of GI malignancies.  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  Dr Huertas''s note reviewed.      Review of Systems    Diet medications more than 10 pertinent systems reviewed.    Objective     /87 (BP Location: Left arm, Patient Position: Sitting, Cuff Size: Standard)   Pulse 66   Ht 5' 8\" (1.727 m)   Wt 65.4 kg (144 lb 3.2 oz)   BMI 21.93 kg/m²     Physical Exam  Vitals and nursing note reviewed.   Constitutional:       General: He is not in acute distress.     Appearance: He is well-developed.   HENT:      Head: Normocephalic and atraumatic.      Mouth/Throat:      Mouth: Mucous membranes are moist.   Eyes:      Conjunctiva/sclera: Conjunctivae normal.   Cardiovascular:      Rate and Rhythm: Normal rate and regular rhythm.      Heart sounds: No murmur heard.  Pulmonary:      Effort: Pulmonary effort is normal. No respiratory distress.      Breath sounds: Normal breath sounds.   Abdominal:      General: Abdomen is flat. Bowel sounds are normal.      Palpations: Abdomen is soft. There is no mass.      Tenderness: There is no abdominal tenderness. There is no guarding or rebound.   Musculoskeletal:         General: No swelling.      Cervical back: Neck supple.   Skin:     General: Skin is warm and dry.   Neurological:      Mental Status: He is alert.   Psychiatric:         Mood and Affect: Mood normal.         "

## 2024-10-14 NOTE — H&P (VIEW-ONLY)
Ambulatory Visit  Name: Marin Blancas      : 1950      MRN: [id number]  Encounter Provider: Perfecto Giang MD  Encounter Date: 10/14/2024   Encounter department: [facility] [address] [facility]    Assessment & Plan  Pharyngoesophageal dysphagia  Patient complains of difficulty swallowing food sticking in the upper mid chest, bougie dilatation and more than 10 years ago resolved his symptoms until few weeks ago.  No weight loss GI bleeding, antireflux measure reviewed diet discussed, denies excessive reflux, schedule EGD possible dilatation patient agrees procedure discussed with anesthesia and he consents.  Orders:    Ambulatory Referral to Gastroenterology    EGD; Future    Polyp of sigmoid colon, unspecified type    Orders:    Ambulatory Referral to Gastroenterology    Laryngopharyngeal reflux (LPR)    Orders:    Ambulatory Referral to Gastroenterology    Screening for colon cancer  Last colonoscopy more than 10 years ago, screening colonoscopies discussed with the patient prep reviewed, he agreed in the office, he called after he left the office half an hour later by the time I was discussing this note and wanted to hold off colonoscopy.  He was made aware that he is due for colonoscopy as well as his medical doctor recommends but he wants to cancel for now.  Orders:    Colonoscopy; Future    polyethylene glycol (Golytely) 4000 mL solution; Take 4,000 mL by mouth once for 1 dose Take according to instructions given by the office for colonoscopy bowel prep.      History of Present Illness     Marin Blancas is a 74 y.o. male who presents patient came in for evaluation of his difficulty swallowing, food sticking in the upper chest, he had similar symptoms 12 years ago had EGD with bougie dilatation done and the symptoms resolved and have not had symptoms until few weeks ago.  Has not lost any weight recently, denies any vomiting GI bleeding melena hematochezia bowels are generally  "regular no apparent blood in stools.  Denies any family history of GI malignancies.  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  Dr Huertas''s note reviewed.      Review of Systems    Diet medications more than 10 pertinent systems reviewed.    Objective     /87 (BP Location: Left arm, Patient Position: Sitting, Cuff Size: Standard)   Pulse 66   Ht 5' 8\" (1.727 m)   Wt 65.4 kg (144 lb 3.2 oz)   BMI 21.93 kg/m²     Physical Exam    "

## 2024-10-14 NOTE — PROGRESS NOTES
Ambulatory Visit  Name: Marin Blancas      : 1950      MRN: [id number]  Encounter Provider: Perfecto Giang MD  Encounter Date: 10/14/2024   Encounter department: [facility] [address] [facility]    Assessment & Plan  Pharyngoesophageal dysphagia  Patient complains of difficulty swallowing food sticking in the upper mid chest, bougie dilatation and more than 10 years ago resolved his symptoms until few weeks ago.  No weight loss GI bleeding, antireflux measure reviewed diet discussed, denies excessive reflux, schedule EGD possible dilatation patient agrees procedure discussed with anesthesia and he consents.  Orders:    Ambulatory Referral to Gastroenterology    EGD; Future    Polyp of sigmoid colon, unspecified type    Orders:    Ambulatory Referral to Gastroenterology    Laryngopharyngeal reflux (LPR)    Orders:    Ambulatory Referral to Gastroenterology    Screening for colon cancer  Last colonoscopy more than 10 years ago, screening colonoscopies discussed with the patient prep reviewed, he agreed in the office, he called after he left the office half an hour later by the time I was discussing this note and wanted to hold off colonoscopy.  He was made aware that he is due for colonoscopy as well as his medical doctor recommends but he wants to cancel for now.  Orders:    Colonoscopy; Future    polyethylene glycol (Golytely) 4000 mL solution; Take 4,000 mL by mouth once for 1 dose Take according to instructions given by the office for colonoscopy bowel prep.      History of Present Illness     Marin Blancas is a 74 y.o. male who presents patient came in for evaluation of his difficulty swallowing, food sticking in the upper chest, he had similar symptoms 12 years ago had EGD with bougie dilatation done and the symptoms resolved and have not had symptoms until few weeks ago.  Has not lost any weight recently, denies any vomiting GI bleeding melena hematochezia bowels are generally  "regular no apparent blood in stools.  Denies any family history of GI malignancies.  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  Dr Huertas''s note reviewed.      Review of Systems    Diet medications more than 10 pertinent systems reviewed.    Objective     /87 (BP Location: Left arm, Patient Position: Sitting, Cuff Size: Standard)   Pulse 66   Ht 5' 8\" (1.727 m)   Wt 65.4 kg (144 lb 3.2 oz)   BMI 21.93 kg/m²     Physical Exam  Vitals and nursing note reviewed.   Constitutional:       General: He is not in acute distress.     Appearance: He is well-developed.   HENT:      Head: Normocephalic and atraumatic.      Mouth/Throat:      Mouth: Mucous membranes are moist.   Eyes:      Conjunctiva/sclera: Conjunctivae normal.   Cardiovascular:      Rate and Rhythm: Normal rate and regular rhythm.      Heart sounds: No murmur heard.  Pulmonary:      Effort: Pulmonary effort is normal. No respiratory distress.      Breath sounds: Normal breath sounds.   Abdominal:      General: Abdomen is flat. Bowel sounds are normal.      Palpations: Abdomen is soft. There is no mass.      Tenderness: There is no abdominal tenderness. There is no guarding or rebound.   Musculoskeletal:         General: No swelling.      Cervical back: Neck supple.   Skin:     General: Skin is warm and dry.   Neurological:      Mental Status: He is alert.   Psychiatric:         Mood and Affect: Mood normal.         "

## 2024-10-14 NOTE — ASSESSMENT & PLAN NOTE
Patient complains of difficulty swallowing food sticking in the upper mid chest, bougie dilatation and more than 10 years ago resolved his symptoms until few weeks ago.  No weight loss GI bleeding, antireflux measure reviewed diet discussed, denies excessive reflux, schedule EGD possible dilatation patient agrees procedure discussed with anesthesia and he consents.  Orders:    Ambulatory Referral to Gastroenterology    EGD; Future

## 2024-10-14 NOTE — TELEPHONE ENCOUNTER
Pt called back an only wants egd at this time. sb      Scheduled date of EGD/colonoscopy (as of today):10/17/24  Physician performing EGD/colonoscopy:Dr Giang   [facility]  Desired bowel prep reviewed with patient:loretta   Instructions reviewed with patient by:sb  Clearances:  none

## 2024-10-17 ENCOUNTER — HOSPITAL ENCOUNTER (OUTPATIENT)
Dept: GASTROENTEROLOGY | Facility: AMBULARY SURGERY CENTER | Age: 74
Setting detail: OUTPATIENT SURGERY
End: 2024-10-17
Attending: INTERNAL MEDICINE
Payer: COMMERCIAL

## 2024-10-17 ENCOUNTER — ANESTHESIA EVENT (OUTPATIENT)
Dept: GASTROENTEROLOGY | Facility: AMBULARY SURGERY CENTER | Age: 74
End: 2024-10-17
Payer: COMMERCIAL

## 2024-10-17 VITALS
RESPIRATION RATE: 16 BRPM | OXYGEN SATURATION: 100 % | DIASTOLIC BLOOD PRESSURE: 60 MMHG | TEMPERATURE: 96.4 F | SYSTOLIC BLOOD PRESSURE: 116 MMHG | HEART RATE: 57 BPM

## 2024-10-17 DIAGNOSIS — R13.14 PHARYNGOESOPHAGEAL DYSPHAGIA: ICD-10-CM

## 2024-10-17 PROCEDURE — 88305 TISSUE EXAM BY PATHOLOGIST: CPT | Performed by: STUDENT IN AN ORGANIZED HEALTH CARE EDUCATION/TRAINING PROGRAM

## 2024-10-17 PROCEDURE — 43450 DILATE ESOPHAGUS 1/MULT PASS: CPT | Performed by: INTERNAL MEDICINE

## 2024-10-17 PROCEDURE — 43239 EGD BIOPSY SINGLE/MULTIPLE: CPT | Performed by: INTERNAL MEDICINE

## 2024-10-17 RX ORDER — LIDOCAINE HYDROCHLORIDE 10 MG/ML
INJECTION, SOLUTION EPIDURAL; INFILTRATION; INTRACAUDAL; PERINEURAL AS NEEDED
Status: DISCONTINUED | OUTPATIENT
Start: 2024-10-17 | End: 2024-10-17

## 2024-10-17 RX ORDER — PROPOFOL 10 MG/ML
INJECTION, EMULSION INTRAVENOUS AS NEEDED
Status: DISCONTINUED | OUTPATIENT
Start: 2024-10-17 | End: 2024-10-17

## 2024-10-17 RX ORDER — SODIUM CHLORIDE, SODIUM LACTATE, POTASSIUM CHLORIDE, CALCIUM CHLORIDE 600; 310; 30; 20 MG/100ML; MG/100ML; MG/100ML; MG/100ML
INJECTION, SOLUTION INTRAVENOUS CONTINUOUS PRN
Status: DISCONTINUED | OUTPATIENT
Start: 2024-10-17 | End: 2024-10-17

## 2024-10-17 RX ORDER — SODIUM CHLORIDE, SODIUM LACTATE, POTASSIUM CHLORIDE, CALCIUM CHLORIDE 600; 310; 30; 20 MG/100ML; MG/100ML; MG/100ML; MG/100ML
125 INJECTION, SOLUTION INTRAVENOUS CONTINUOUS
OUTPATIENT
Start: 2024-10-17

## 2024-10-17 RX ADMIN — SODIUM CHLORIDE, SODIUM LACTATE, POTASSIUM CHLORIDE, AND CALCIUM CHLORIDE: .6; .31; .03; .02 INJECTION, SOLUTION INTRAVENOUS at 09:19

## 2024-10-17 RX ADMIN — LIDOCAINE HYDROCHLORIDE 50 MG: 10 INJECTION, SOLUTION EPIDURAL; INFILTRATION; INTRACAUDAL; PERINEURAL at 09:21

## 2024-10-17 RX ADMIN — PROPOFOL 30 MG: 10 INJECTION, EMULSION INTRAVENOUS at 09:29

## 2024-10-17 RX ADMIN — PROPOFOL 80 MG: 10 INJECTION, EMULSION INTRAVENOUS at 09:21

## 2024-10-17 RX ADMIN — PROPOFOL 50 MG: 10 INJECTION, EMULSION INTRAVENOUS at 09:23

## 2024-10-17 NOTE — ANESTHESIA PREPROCEDURE EVALUATION
Procedure:  EGD    Relevant Problems   CARDIO   (+) HTN (hypertension)      ENDO   (+) Type 2 diabetes mellitus without complication, without long-term current use of insulin (HCC)      GI/HEPATIC   (+) Dysphagia      HEMATOLOGY   (+) Deficiency anemia      Ear/Nose/Throat   (+) Laryngopharyngeal reflux (LPR)        Physical Exam    Airway    Mallampati score: II  TM Distance: >3 FB  Neck ROM: full     Dental   Comment: Denies loose teeth     Cardiovascular  Cardiovascular exam normal    Pulmonary  Pulmonary exam normal     Other Findings  Portions of exam deferred due to low yield and/or unknown COVID status      Anesthesia Plan  ASA Score- 2     Anesthesia Type- IV sedation with anesthesia with ASA Monitors.         Additional Monitors:     Airway Plan:            Plan Factors-Exercise tolerance (METS): >4 METS.    Chart reviewed.   Existing labs reviewed. Patient summary reviewed.    Patient is not a current smoker.              Induction- intravenous.    Postoperative Plan-         Informed Consent- Anesthetic plan and risks discussed with patient.  I personally reviewed this patient with the CRNA. Discussed and agreed on the Anesthesia Plan with the CRNA..

## 2024-10-17 NOTE — ANESTHESIA POSTPROCEDURE EVALUATION
Post-Op Assessment Note    CV Status:  Stable  Pain Score: 0    Pain management: adequate       Mental Status:  Awake and sleepy   Hydration Status:  Stable   PONV Controlled:  None   Airway Patency:  Patent  There is a medical reason for not screening for obstructive sleep apnea and/or for not using two or more mitigation strategies   Post Op Vitals Reviewed: Yes    No anethesia notable event occurred.    Staff: CRNA           Last Filed PACU Vitals:  Vitals Value Taken Time   Temp     Pulse     BP     Resp     SpO2         Modified Enoc:  Activity: 2 (10/17/2024  9:40 AM)  Respiration: 2 (10/17/2024  9:40 AM)  Circulation: 2 (10/17/2024  9:40 AM)  Consciousness: 1 (10/17/2024  9:40 AM)  Oxygen Saturation: 2 (10/17/2024  9:40 AM)  Modified Enoc Score: 9 (10/17/2024  9:40 AM)

## 2024-10-17 NOTE — INTERVAL H&P NOTE
H&P reviewed. After examining the patient I find no changes in the patients condition since the H&P had been written.    Vitals:    10/17/24 0937   BP: 114/60   Pulse: 58   Resp: 18   Temp:    SpO2: 100%

## 2024-10-17 NOTE — ANESTHESIA POSTPROCEDURE EVALUATION
Post-Op Assessment Note    Last Filed PACU Vitals:  Vitals Value Taken Time   Temp     Pulse 57 10/17/24 0957   /60 10/17/24 0957   Resp 16 10/17/24 0957   SpO2 100 % 10/17/24 0957       Modified Enco:  Activity: 2 (10/17/2024  9:57 AM)  Respiration: 2 (10/17/2024  9:57 AM)  Circulation: 2 (10/17/2024  9:57 AM)  Consciousness: 2 (10/17/2024  9:57 AM)  Oxygen Saturation: 2 (10/17/2024  9:57 AM)  Modified Enoc Score: 10 (10/17/2024  9:57 AM)

## 2024-10-17 NOTE — INTERVAL H&P NOTE
H&P reviewed. After examining the patient I find no changes in the patients condition since the H&P had been written.    Vitals:    10/17/24 0832   BP: (!) 187/92   Pulse: (!) 53   Resp: 16   Temp: (!) 96.4 °F (35.8 °C)   SpO2: 100%

## 2024-10-23 PROCEDURE — 88305 TISSUE EXAM BY PATHOLOGIST: CPT | Performed by: STUDENT IN AN ORGANIZED HEALTH CARE EDUCATION/TRAINING PROGRAM

## 2024-10-23 NOTE — RESULT ENCOUNTER NOTE
Please call the patient regarding his abnormal result.  EGD biopsy shows gastritis with H. pylori infection.  Please refer to our clinical staff to initiate quadruple therapy followed by stool antigen testing.  Follow up in my office as needed

## 2024-10-28 ENCOUNTER — TELEPHONE (OUTPATIENT)
Age: 74
End: 2024-10-28

## 2024-10-28 DIAGNOSIS — A04.8 HELICOBACTER PYLORI INFECTION: Primary | ICD-10-CM

## 2024-10-28 RX ORDER — METRONIDAZOLE 250 MG/1
TABLET ORAL
Qty: 56 TABLET | Refills: 0 | Status: SHIPPED | OUTPATIENT
Start: 2024-10-28 | End: 2024-11-11

## 2024-10-28 RX ORDER — TETRACYCLINE HYDROCHLORIDE 500 MG/1
CAPSULE ORAL
Qty: 56 CAPSULE | Refills: 0 | Status: SHIPPED | OUTPATIENT
Start: 2024-10-28 | End: 2024-11-11

## 2024-10-28 RX ORDER — BISMUTH SUBSALICYLATE 262 MG/1
TABLET, CHEWABLE ORAL
Qty: 56 TABLET | Refills: 0 | Status: SHIPPED | OUTPATIENT
Start: 2024-10-28

## 2024-10-28 RX ORDER — OMEPRAZOLE 40 MG/1
CAPSULE, DELAYED RELEASE ORAL
Qty: 28 CAPSULE | Refills: 0 | Status: SHIPPED | OUTPATIENT
Start: 2024-10-28

## 2024-10-28 NOTE — TELEPHONE ENCOUNTER
Called and spoke with patient.Informed him of results and recommendations.Quad therapy ordered.Patient understands he will take the Omeprazole twice a day for the 14 day treatment.Instructions given for Quad therapy and stool antigen 1 month after completion of antibiotics and acid reflux medication is withheld for 2 weeks.Patient verbalized understanding.

## 2024-10-28 NOTE — TELEPHONE ENCOUNTER
----- Message from Jesusita SEQUEIRA sent at 10/24/2024  9:26 AM EDT -----    ----- Message -----  From: Perfecto Giang MD  Sent: 10/23/2024   2:36 PM EDT  To: [address] Clinical    Please call the patient regarding his abnormal result.  EGD biopsy shows gastritis with H. pylori infection.  Please refer to our clinical staff to initiate quadruple therapy followed by stool antigen testing.  Follow up in my office as needed

## 2024-11-20 ENCOUNTER — PROBLEM (OUTPATIENT)
Dept: URBAN - METROPOLITAN AREA CLINIC 6 | Facility: CLINIC | Age: 74
End: 2024-11-20

## 2024-11-20 DIAGNOSIS — H40.1131: ICD-10-CM

## 2024-11-20 DIAGNOSIS — G45.3: ICD-10-CM

## 2024-11-20 LAB — MRI SCAN: NORMAL

## 2024-11-20 PROCEDURE — 92014 COMPRE OPH EXAM EST PT 1/>: CPT

## 2024-11-20 ASSESSMENT — TONOMETRY
OD_IOP_MMHG: 23
OS_IOP_MMHG: 18

## 2024-11-20 ASSESSMENT — VISUAL ACUITY
OD_PH: 20/30-2
OS_SC: 20/40+2
OD_SC: 20/50-2

## 2025-05-02 ENCOUNTER — TELEPHONE (OUTPATIENT)
Age: 75
End: 2025-05-02

## 2025-05-02 NOTE — TELEPHONE ENCOUNTER
LVM for patient to call office to schedule a Medicare Wellness Exam and/or if they have moved or established with a new PCP.

## 2025-05-27 ENCOUNTER — TELEPHONE (OUTPATIENT)
Age: 75
End: 2025-05-27

## 2025-05-27 NOTE — TELEPHONE ENCOUNTER
Patient last visit was 8/27/24.  He would like to have labs ordered, complete labs and also needs to schedule F/U.  I could find no openings anytime soon.  Please advise how to proceed.  Thank you.

## 2025-06-05 ENCOUNTER — OFFICE VISIT (OUTPATIENT)
Dept: INTERNAL MEDICINE CLINIC | Facility: CLINIC | Age: 75
End: 2025-06-05
Payer: COMMERCIAL

## 2025-06-05 VITALS
OXYGEN SATURATION: 99 % | WEIGHT: 150 LBS | SYSTOLIC BLOOD PRESSURE: 132 MMHG | HEART RATE: 70 BPM | RESPIRATION RATE: 16 BRPM | BODY MASS INDEX: 22.73 KG/M2 | DIASTOLIC BLOOD PRESSURE: 84 MMHG | TEMPERATURE: 98.4 F | HEIGHT: 68 IN

## 2025-06-05 DIAGNOSIS — E53.8 FOLIC ACID DEFICIENCY: ICD-10-CM

## 2025-06-05 DIAGNOSIS — M25.572 ACUTE LEFT ANKLE PAIN: ICD-10-CM

## 2025-06-05 DIAGNOSIS — E55.9 VITAMIN D DEFICIENCY: ICD-10-CM

## 2025-06-05 DIAGNOSIS — R97.20 ELEVATED PSA: ICD-10-CM

## 2025-06-05 DIAGNOSIS — R22.42 LOCALIZED SWELLING OF LEFT FOOT: ICD-10-CM

## 2025-06-05 DIAGNOSIS — K63.5 POLYP OF SIGMOID COLON, UNSPECIFIED TYPE: ICD-10-CM

## 2025-06-05 DIAGNOSIS — E53.8 VITAMIN B12 DEFICIENCY: ICD-10-CM

## 2025-06-05 DIAGNOSIS — K21.9 LARYNGOPHARYNGEAL REFLUX (LPR): ICD-10-CM

## 2025-06-05 DIAGNOSIS — M79.672 LEFT FOOT PAIN: ICD-10-CM

## 2025-06-05 DIAGNOSIS — E11.9 TYPE 2 DIABETES MELLITUS WITHOUT COMPLICATION, WITHOUT LONG-TERM CURRENT USE OF INSULIN (HCC): Primary | ICD-10-CM

## 2025-06-05 DIAGNOSIS — R77.8 ABNORMAL SERUM PROTEIN ELECTROPHORESIS: ICD-10-CM

## 2025-06-05 DIAGNOSIS — I10 PRIMARY HYPERTENSION: ICD-10-CM

## 2025-06-05 PROBLEM — K21.00 GASTROESOPHAGEAL REFLUX DISEASE WITH ESOPHAGITIS: Status: ACTIVE | Noted: 2025-06-05

## 2025-06-05 PROBLEM — K21.00 GASTROESOPHAGEAL REFLUX DISEASE WITH ESOPHAGITIS: Status: RESOLVED | Noted: 2025-06-05 | Resolved: 2025-06-05

## 2025-06-05 PROCEDURE — 99214 OFFICE O/P EST MOD 30 MIN: CPT | Performed by: INTERNAL MEDICINE

## 2025-06-05 PROCEDURE — G2211 COMPLEX E/M VISIT ADD ON: HCPCS | Performed by: INTERNAL MEDICINE

## 2025-06-05 RX ORDER — OMEPRAZOLE 40 MG/1
40 CAPSULE, DELAYED RELEASE ORAL
Qty: 30 CAPSULE | Refills: 2 | Status: SHIPPED | OUTPATIENT
Start: 2025-06-05 | End: 2025-12-02

## 2025-06-05 RX ORDER — LATANOPROST 50 UG/ML
1 SOLUTION/ DROPS OPHTHALMIC
COMMUNITY
Start: 2025-04-15

## 2025-06-05 NOTE — ASSESSMENT & PLAN NOTE
Folic acid deficiency resolved.  Patient said he he is not taking any folic acid and he was not taking during last blood test as well.

## 2025-06-05 NOTE — PROGRESS NOTES
Name: Marin Blancas      : 1950      MRN: [id number]  Encounter Provider: Jimi Huertas MD  Encounter Date: 2025   Encounter department: [facility] INTERNAL MEDICINE  :  Assessment & Plan  Type 2 diabetes mellitus without complication, without long-term current use of insulin (HCC)  Patient stated he has been watching his diet for sugar and carbs intake.  Advised to continue 1800-calorie low sugar low-carb diet we will follow blood sugar hemoglobin A1c.  Lab Results   Component Value Date    HGBA1C 6.9 (H) 08/10/2024       Orders:  •  Albumin / creatinine urine ratio; Future  •  CBC and differential; Future  •  Comprehensive metabolic panel; Future  •  Hemoglobin A1C; Future  •  Ambulatory Referral to Podiatry; Future  •  Hemoglobin A1C; Future    Primary hypertension  Patient stopped taking his losartan.  Patient stated he does not do any blood pressure medication at present.  He will continue to watch diet for salt intake.  Orders:  •  CBC and differential; Future  •  Comprehensive metabolic panel; Future    Elevated PSA  He was seen by Dr. Babcock urologist 2024  he was advised to see him in 6 months but patient has not seen urologist yet.  Advised to see his urologist Dr. Babcock and will order PSA and free PSA as requested by Dr. Babcock.  Orders:  •  PSA, total and free; Future    Vitamin D deficiency  Vitamin D deficiency resolved on vitamin D supplement continue vitamin D supplement.       Vitamin B12 deficiency  Vitamin B12 deficiency resolved as a matter fact his vitamin B12 was elevated last time since then he cut down his vitamin B12 supplement from 2 to 1 tablet daily.  Will follow vitamin B12 level.  Orders:  •  Comprehensive metabolic panel; Future  •  Vitamin B12; Future    Folic acid deficiency  Folic acid deficiency resolved.  Patient said he he is not taking any folic acid and he was not taking during last blood test as well.       Polyp of sigmoid colon,  unspecified type  He was seen by GI was advised colonoscopy but he has not scheduled yet.  Advised to see GYN for colonoscopy.  Orders:  •  Ambulatory Referral to Gastroenterology; Future    Abnormal serum protein electrophoresis  His  SPEP was abnormal August 2024.  He was referred to see hematologist/oncologist on August 27, 2024 but patient never went to see hematologist.  Strongly advised to see hematologist/oncologist to rule out multiple myeloma versus MUGS  Orders:  •  CBC and differential; Future  •  Comprehensive metabolic panel; Future  •  Ambulatory Referral to Hematology / Oncology; Future    BMI 22.0-22.9, adult         Left foot pain  Patient on for left foot pain in the plantar aspect after he has a nail injury on May 8, 2024.  It happened at home while he was walking in the yard although he did have shoes on.  Pain is mid plantar aspect of the left foot.  Also has a left foot swelling below lateral malleolus area.  Also tender lateral malleolus area as well.  Will order x-ray of the foot as well as left ankle.  Will refer to podiatrist.  He does have a small varicose vein might be because of swelling versus arthritis.  He already got his [facility] after nail injury.  Orders:  •  Ambulatory Referral to Podiatry; Future    Acute left ankle pain  As above.  Orders:  •  Ambulatory Referral to Podiatry; Future    Localized swelling of left foot  He has swelling of the left foot dorsal aspect laterally.  As mentioned above he does have small varicose vein also.  No edema of leg.  Homans' sign negative.       Laryngopharyngeal reflux (LPR)  Patient still occasionally takes omeprazole.  He had a EGD.    Orders:  •  omeprazole (PriLOSEC) 40 MG capsule; Take 1 capsule (40 mg total) by mouth daily before breakfast           History of Present Illness   HPI  75-year-old male patient present for follow-up his medical problems    Current Medications[1]     Past Medical History[2]  "    Review of Systems   Constitutional:  Negative for chills and fever.   HENT:  Negative for congestion, ear pain, hearing loss, nosebleeds, sinus pain, sore throat and trouble swallowing.    Eyes:  Negative for discharge, redness and visual disturbance.   Respiratory:  Negative for cough, chest tightness and shortness of breath.    Cardiovascular:  Negative for chest pain and palpitations.   Gastrointestinal:  Negative for abdominal pain, blood in stool, constipation, diarrhea, nausea and vomiting.   Genitourinary:  Negative for dysuria, flank pain and hematuria.   Musculoskeletal:  Positive for arthralgias (Left ankle and left foot pain). Negative for myalgias and neck pain.   Skin:  Negative for rash.   Neurological:  Negative for dizziness, speech difficulty, weakness and headaches.   Hematological:  Does not bruise/bleed easily.   Psychiatric/Behavioral:  Negative for agitation and behavioral problems.          Objective   /84 (BP Location: Left arm, Patient Position: Sitting, Cuff Size: Large)   Pulse 70   Temp 98.4 °F (36.9 °C) (Temporal)   Resp 16   Ht 5' 8\" (1.727 m)   Wt 68 kg (150 lb)   SpO2 99%   BMI 22.81 kg/m²      Physical Exam  Vitals and nursing note reviewed.   Constitutional:       General: He is not in acute distress.     Appearance: He is well-developed. He is obese.   HENT:      Head: Normocephalic and atraumatic.      Right Ear: External ear normal.      Left Ear: External ear normal.      Nose: Nose normal.      Mouth/Throat:      Mouth: Mucous membranes are moist.      Pharynx: Oropharynx is clear.     Eyes:      General: No scleral icterus.        Right eye: No discharge.         Left eye: No discharge.      Extraocular Movements: Extraocular movements intact.      Conjunctiva/sclera: Conjunctivae normal.       Cardiovascular:      Rate and Rhythm: Normal rate and regular rhythm.      Pulses: Pulses are weak.           Dorsalis pedis pulses are 1+ on the right side and 1+ on " the left side.      Heart sounds: No murmur heard.  Pulmonary:      Effort: Pulmonary effort is normal. No respiratory distress.      Breath sounds: Normal breath sounds. No wheezing, rhonchi or rales.   Abdominal:      General: There is no distension.      Palpations: Abdomen is soft.      Tenderness: There is no abdominal tenderness.     Musculoskeletal:         General: Tenderness (Has a tenderness left foot dorsal aspect below lateral malleolus area as well as around lateral malleus area as well.  Has a small tiny varicose veins.  Has a puncture wound left foot to plantar aspect no active discharge or no redness noted.) present. No swelling. Normal range of motion.      Cervical back: Normal range of motion and neck supple.      Right lower leg: No edema.      Left lower leg: No edema.        Feet:    Feet:      Right foot:      Skin integrity: Callus and dry skin present. No ulcer, skin breakdown, erythema or warmth.      Left foot:      Skin integrity: Callus and dry skin present. No ulcer, skin breakdown, erythema or warmth.     Skin:     General: Skin is warm and dry.      Capillary Refill: Capillary refill takes less than 2 seconds.      Findings: No rash.     Neurological:      General: No focal deficit present.      Mental Status: He is alert and oriented to person, place, and time.     Psychiatric:         Mood and Affect: Mood normal.         Behavior: Behavior normal.         Thought Content: Thought content normal.         Judgment: Judgment normal.       Diabetic Foot Exam    Patient's shoes and socks removed.    Right Foot/Ankle   Right Foot Inspection  Skin Exam: skin normal, skin intact, dry skin, callus and callus. No warmth, no erythema, no maceration, no abnormal color, no pre-ulcer and no ulcer.     Toe Exam: ROM and strength within normal limits.     Sensory   Vibration: absent  Monofilament testing: diminished    Vascular  Capillary refills: elevated  The right DP pulse is 1+.     Left  Foot/Ankle  Left Foot Inspection  Skin Exam: skin normal, skin intact, dry skin and callus. No warmth, no erythema, no maceration, normal color, no pre-ulcer and no ulcer.     Toe Exam: ROM and strength within normal limits.     Sensory   Vibration: absent  Monofilament testing: diminished    Vascular  Capillary refills: elevated  The left DP pulse is 1+.     Assign Risk Category  No deformity present  Loss of protective sensation  Weak pulses  Risk: 2               [1]    Current Outpatient Medications:   •  ascorbic acid (VITAMIN C) 250 mg tablet, Take 250 mg by mouth in the morning., Disp: , Rfl:   •  cholecalciferol (VITAMIN D3) 1,000 units tablet, Take 1,000 Units by mouth in the morning., Disp: , Rfl:   •  latanoprost (XALATAN) 0.005 % ophthalmic solution, Administer 1 drop to both eyes daily at bedtime, Disp: , Rfl:   •  omeprazole (PriLOSEC) 40 MG capsule, Take 1 capsule (40 mg total) by mouth daily before breakfast, Disp: 30 capsule, Rfl: 2  •  vitamin B-12 (VITAMIN B-12) 500 mcg tablet, Take 500 mcg by mouth in the morning., Disp: , Rfl:   •  losartan (COZAAR) 50 mg tablet, Take 1 tablet (50 mg total) by mouth daily (Patient not taking: Reported on 10/14/2024), Disp: 30 tablet, Rfl: 1  •  polyethylene glycol (Golytely) 4000 mL solution, Take 4,000 mL by mouth once for 1 dose Take according to instructions given by the office for colonoscopy bowel prep., Disp: 4000 mL, Rfl: 0[2]  Past Medical History:  Diagnosis Date   • Abnormal serum protein electrophoresis 06/05/2025   • BMI 21.0-21.9, adult 04/20/2021   • Deficiency anemia 08/07/2024   • Dysphagia 04/20/2021   • Elevated PSA 08/07/2024   • Folic acid deficiency 04/20/2021   • Gastroesophageal reflux disease with esophagitis 06/05/2025   • Gastroesophageal reflux disease without esophagitis 06/05/2025   • HTN (hypertension) 04/20/2021   • Laryngopharyngeal reflux (LPR) 08/07/2024   • Localized swelling of left foot 06/05/2025   • Medicare annual wellness  visit, subsequent 08/07/2024   • Polyp of sigmoid colon 04/20/2021   • Type 2 diabetes mellitus without complication, without long-term current use of insulin (HCC) 04/20/2021   • Vitamin B12 deficiency 04/20/2021   • Vitamin D deficiency 08/07/2024   • 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

## 2025-06-05 NOTE — ASSESSMENT & PLAN NOTE
He was seen by Dr. Babcock urologist October 2024  he was advised to see him in 6 months but patient has not seen urologist yet.  Advised to see his urologist Dr. Babcock and will order PSA and free PSA as requested by Dr. Babcock.  Orders:  •  PSA, total and free; Future

## 2025-06-05 NOTE — ASSESSMENT & PLAN NOTE
His  SPEP was abnormal August 2024.  He was referred to see hematologist/oncologist on August 27, 2024 but patient never went to see hematologist.  Strongly advised to see hematologist/oncologist to rule out multiple myeloma versus MUGS  Orders:  •  CBC and differential; Future  •  Comprehensive metabolic panel; Future  •  Ambulatory Referral to Hematology / Oncology; Future

## 2025-06-05 NOTE — ASSESSMENT & PLAN NOTE
Patient still occasionally takes omeprazole.  He had a EGD.    Orders:  •  omeprazole (PriLOSEC) 40 MG capsule; Take 1 capsule (40 mg total) by mouth daily before breakfast

## 2025-06-05 NOTE — ASSESSMENT & PLAN NOTE
He has swelling of the left foot dorsal aspect laterally.  As mentioned above he does have small varicose vein also.  No edema of leg.  Homans' sign negative.

## 2025-06-05 NOTE — PATIENT INSTRUCTIONS
Patient was advised to continue present medications. discussed with the patient medications and laboratory data in detail.  Follow-up with me in 3 months or as advised.  If any blood test was ordered please do 1 week prior to next appointment unless advise to get earlier.  If you have any questions please call the office [phone]  Patient Education     Type 2 diabetes   The Basics   Written by the doctors and editors at [facility]   What is type 2 diabetes? -- This is a disorder that disrupts the way the body uses sugar. It is sometimes called type 2 diabetes mellitus.  All of the cells in the body need sugar to work normally. Sugar gets into the cells with the help of a hormone called insulin. Insulin is made by the pancreas, an organ in the belly. If there is not enough insulin, or if cells in the body don't respond normally to insulin, sugar builds up in the blood. That is what happens to people with diabetes.  There are 2 different types of diabetes:   In type 1 diabetes, the pancreas makes little or no insulin.   In type 2 diabetes, the pancreas still makes some insulin, but the cells in the body stop responding normally. Eventually, the pancreas cannot make enough insulin to keep up.  Having excess body weight or obesity increases a person's risk of developing type 2 diabetes. But people without excess body weight can get diabetes, too.  What are the symptoms of type 2 diabetes? -- Type 2 diabetes usually causes no symptoms. When symptoms do happen, they include:   Needing to urinate often   Intense thirst   Blurry vision  Can diabetes lead to other health problems? -- Yes. Type 2 diabetes might not make you feel sick. But if it is not managed, it can lead to serious problems over time, such as:   Heart attacks   Strokes   Kidney disease   Vision problems (or even blindness)   Pain or loss of feeling in the hands and feet   Needing to have fingers, toes, or other body parts removed (amputated)  How do I know  "if I have type 2 diabetes? -- Your doctor or nurse can do a blood test. There are 2 tests that can be used for this. Both involve measuring the amount of sugar in your blood, called your \"blood sugar\" or \"blood glucose\":   One of the tests measures your blood sugar at the time the blood sample is taken. This test is done in the morning. You can't eat or drink anything except water for at least 8 hours before the test.   The other test shows what your average blood sugar has been for the past 2 to 3 months. This blood test is called \"hemoglobin A1C\" or just \"A1C.\" It can be checked at any time of the day, even if you have recently eaten.  How is type 2 diabetes treated? -- The goals of treatment are to manage your blood sugar and lower the risk of future problems that can happen in people with diabetes.  Treatment might include:   Lifestyle changes - This is an important part of managing diabetes. It includes eating healthy foods and getting plenty of physical activity.   Medicines - There are a few medicines that help lower blood sugar. Some people need to take pills that help the body make more insulin or that help insulin do its job. Others need insulin shots.  Depending on what medicines you take, you might need to check your blood sugar regularly at home. But not everyone with type 2 diabetes needs to do this. Your doctor or nurse will tell you if you should be checking your blood sugar, and when and how to do this.  Sometimes, people with type 2 diabetes also need medicines to help prevent problems caused by the disease. For instance, medicines used to lower blood pressure can reduce the chances of a heart attack or stroke.   General medical care - It's also important to take care of other areas of your health. This includes watching your blood pressure and cholesterol levels. You should also get certain vaccines, such as vaccines to protect against the flu and coronavirus disease 2019 (\"COVID-19\"). Some people " also need a vaccine to prevent pneumonia.  Can type 2 diabetes be prevented? -- Yes. To lower your chances of getting type 2 diabetes, the most important thing you can do is eat a healthy diet and get plenty of physical activity. This can help you lose weight if you are overweight. But eating well and being active are also good for your overall health. Even gentle activity, like walking, has benefits.  If you smoke, quitting can also lower your risk of type 2 diabetes. Quitting smoking can be difficult, but your doctor or nurse can help.  All topics are updated as new evidence becomes available and our peer review process is complete.  This topic retrieved from [employer] on: Apr 24, 2024.  Topic 84372 Version 23.0  Release: 32.3.2 - C32.113  © 2024 UpToDate, Inc. and/or its affiliates. All rights reserved.  Consumer Information Use and Disclaimer   Disclaimer: This generalized information is a limited summary of diagnosis, treatment, and/or medication information. It is not meant to be comprehensive and should be used as a tool to help the user understand and/or assess potential diagnostic and treatment options. It does NOT include all information about conditions, treatments, medications, side effects, or risks that may apply to a specific patient. It is not intended to be medical advice or a substitute for the medical advice, diagnosis, or treatment of a health care provider based on the health care provider's examination and assessment of a patient's specific and unique circumstances. Patients must speak with a health care provider for complete information about their health, medical questions, and treatment options, including any risks or benefits regarding use of medications. This information does not endorse any treatments or medications as safe, effective, or approved for treating a specific patient. UpToDate, Inc. and its affiliates disclaim any warranty or liability relating to this information or the use  thereof.The use of this information is governed by the Terms of Use, available at https://www.wolterskluwer.com/en/know/clinical-effectiveness-terms. 2024© UpToDate, Inc. and its affiliates and/or licensors. All rights reserved.  Copyright   © 2024 [employer] and/or its affiliates. All rights reserved.

## 2025-06-05 NOTE — ASSESSMENT & PLAN NOTE
He was seen by GI was advised colonoscopy but he has not scheduled yet.  Advised to see GYN for colonoscopy.  Orders:  •  Ambulatory Referral to Gastroenterology; Future

## 2025-06-05 NOTE — ASSESSMENT & PLAN NOTE
Vitamin B12 deficiency resolved as a matter fact his vitamin B12 was elevated last time since then he cut down his vitamin B12 supplement from 2 to 1 tablet daily.  Will follow vitamin B12 level.  Orders:  •  Comprehensive metabolic panel; Future  •  Vitamin B12; Future

## 2025-06-05 NOTE — ASSESSMENT & PLAN NOTE
Patient stated he has been watching his diet for sugar and carbs intake.  Advised to continue 1800-calorie low sugar low-carb diet we will follow blood sugar hemoglobin A1c.  Lab Results   Component Value Date    HGBA1C 6.9 (H) 08/10/2024       Orders:  •  Albumin / creatinine urine ratio; Future  •  CBC and differential; Future  •  Comprehensive metabolic panel; Future  •  Hemoglobin A1C; Future  •  Ambulatory Referral to Podiatry; Future  •  Hemoglobin A1C; Future

## 2025-06-05 NOTE — ASSESSMENT & PLAN NOTE
Patient stopped taking his losartan.  Patient stated he does not do any blood pressure medication at present.  He will continue to watch diet for salt intake.  Orders:  •  CBC and differential; Future  •  Comprehensive metabolic panel; Future

## 2025-06-09 ENCOUNTER — RESULTS FOLLOW-UP (OUTPATIENT)
Dept: INTERNAL MEDICINE CLINIC | Facility: CLINIC | Age: 75
End: 2025-06-09

## 2025-06-09 LAB
ALBUMIN SERPL-MCNC: 3.9 G/DL (ref 3.6–5.1)
ALBUMIN/CREAT UR: 27 MG/G CREAT
ALBUMIN/GLOB SERPL: 0.9 (CALC) (ref 1–2.5)
ALP SERPL-CCNC: 74 U/L (ref 35–144)
ALT SERPL-CCNC: 7 U/L (ref 9–46)
AST SERPL-CCNC: 13 U/L (ref 10–35)
BASOPHILS # BLD AUTO: 20 CELLS/UL (ref 0–200)
BASOPHILS NFR BLD AUTO: 0.3 %
BILIRUB SERPL-MCNC: 0.8 MG/DL (ref 0.2–1.2)
BUN SERPL-MCNC: 14 MG/DL (ref 7–25)
BUN/CREAT SERPL: ABNORMAL (CALC) (ref 6–22)
CALCIUM SERPL-MCNC: 8.7 MG/DL (ref 8.6–10.3)
CHLORIDE SERPL-SCNC: 106 MMOL/L (ref 98–110)
CO2 SERPL-SCNC: 25 MMOL/L (ref 20–32)
CREAT SERPL-MCNC: 0.72 MG/DL (ref 0.7–1.28)
CREAT UR-MCNC: 63 MG/DL (ref 20–320)
EOSINOPHIL # BLD AUTO: 150 CELLS/UL (ref 15–500)
EOSINOPHIL NFR BLD AUTO: 2.3 %
ERYTHROCYTE [DISTWIDTH] IN BLOOD BY AUTOMATED COUNT: 12 % (ref 11–15)
GFR/BSA.PRED SERPLBLD CYS-BASED-ARV: 95 ML/MIN/1.73M2
GLOBULIN SER CALC-MCNC: 4.3 G/DL (CALC) (ref 1.9–3.7)
GLUCOSE SERPL-MCNC: 139 MG/DL (ref 65–99)
HBA1C MFR BLD: 7.3 %
HCT VFR BLD AUTO: 40.4 % (ref 38.5–50)
HGB BLD-MCNC: 13 G/DL (ref 13.2–17.1)
LYMPHOCYTES # BLD AUTO: 2451 CELLS/UL (ref 850–3900)
LYMPHOCYTES NFR BLD AUTO: 37.7 %
MCH RBC QN AUTO: 26.6 PG (ref 27–33)
MCHC RBC AUTO-ENTMCNC: 32.2 G/DL (ref 32–36)
MCV RBC AUTO: 82.6 FL (ref 80–100)
MICROALBUMIN UR-MCNC: 1.7 MG/DL
MONOCYTES # BLD AUTO: 494 CELLS/UL (ref 200–950)
MONOCYTES NFR BLD AUTO: 7.6 %
NEUTROPHILS # BLD AUTO: 3387 CELLS/UL (ref 1500–7800)
NEUTROPHILS NFR BLD AUTO: 52.1 %
PLATELET # BLD AUTO: 170 THOUSAND/UL (ref 140–400)
PMV BLD REES-ECKER: 11.5 FL (ref 7.5–12.5)
POTASSIUM SERPL-SCNC: 4.3 MMOL/L (ref 3.5–5.3)
PROT SERPL-MCNC: 8.2 G/DL (ref 6.1–8.1)
PSA FREE MFR SERPL: 23 % (CALC)
PSA FREE SERPL-MCNC: 1.5 NG/ML
PSA SERPL-MCNC: 6.4 NG/ML
RBC # BLD AUTO: 4.89 MILLION/UL (ref 4.2–5.8)
SODIUM SERPL-SCNC: 137 MMOL/L (ref 135–146)
VIT B12 SERPL-MCNC: >2000 PG/ML (ref 200–1100)
WBC # BLD AUTO: 6.5 THOUSAND/UL (ref 3.8–10.8)

## 2025-06-25 ENCOUNTER — TELEPHONE (OUTPATIENT)
Dept: HEMATOLOGY ONCOLOGY | Facility: CLINIC | Age: 75
End: 2025-06-25

## 2025-06-25 ENCOUNTER — CONSULT (OUTPATIENT)
Dept: HEMATOLOGY ONCOLOGY | Facility: MEDICAL CENTER | Age: 75
End: 2025-06-25
Attending: INTERNAL MEDICINE
Payer: COMMERCIAL

## 2025-06-25 VITALS
RESPIRATION RATE: 16 BRPM | SYSTOLIC BLOOD PRESSURE: 156 MMHG | BODY MASS INDEX: 20.59 KG/M2 | HEIGHT: 72 IN | HEART RATE: 71 BPM | DIASTOLIC BLOOD PRESSURE: 86 MMHG | OXYGEN SATURATION: 99 % | WEIGHT: 152 LBS | TEMPERATURE: 98.6 F

## 2025-06-25 DIAGNOSIS — R77.8 ABNORMAL SERUM PROTEIN ELECTROPHORESIS: ICD-10-CM

## 2025-06-25 PROCEDURE — 99203 OFFICE O/P NEW LOW 30 MIN: CPT | Performed by: PHYSICIAN ASSISTANT

## 2025-06-25 NOTE — ASSESSMENT & PLAN NOTE
Patient is a 75-year-old who presents for evaluation of abnormal SPEP.    He had serum protein electrophoresis drawn on 8/10/2024.  At that time he had a finding of M spike of 2.1.  Immunofixation identified this as IgG lambda.  Charting states that patient was referred to hematology in late August 2024 but was noncompliant with being seen.    He has no significant anemia.  No renal dysfunction.  No hypercalcemia.  He has very slightly elevated total protein level.    Last CBC was drawn on 6/7/2025.  WBC 6.5, hemoglobin 13.0, platelets 170 BUN 14, creatinine 0.72, GFR 95.  Total protein 8.2.  Calcium 8.7.    Patient will complete additional lab work now to include SPEP, immunoglobulins, free light chains, beta-2 microglobulin.    He will return to clinic in 6 months time, possibly sooner based on the results of the lab work.  We discussed the importance of having lab work drawn.

## 2025-06-25 NOTE — PROGRESS NOTES
Name: Marin Blancas      : 1950      MRN: [id number]  Encounter Provider: Cheryl Vernon PA-C  Encounter Date: 2025   Encounter department: [facility]  :  Assessment & Plan  Abnormal serum protein electrophoresis  Patient is a 75-year-old who presents for evaluation of abnormal SPEP.    He had serum protein electrophoresis drawn on 8/10/2024.  At that time he had a finding of M spike of 2.1.  Immunofixation identified this as IgG lambda.  Charting states that patient was referred to hematology in late 2024 but was noncompliant with being seen.    He has no significant anemia.  No renal dysfunction.  No hypercalcemia.  He has very slightly elevated total protein level.    Last CBC was drawn on 2025.  WBC 6.5, hemoglobin 13.0, platelets 170 BUN 14, creatinine 0.72, GFR 95.  Total protein 8.2.  Calcium 8.7.    Patient will complete additional lab work now to include SPEP, immunoglobulins, free light chains, beta-2 microglobulin.    He will return to clinic in 6 months time, possibly sooner based on the results of the lab work.  We discussed the importance of having lab work drawn.    History of Present Illness   Chief Complaint   Patient presents with    Consult   Patient is a 75-year-old who presents for evaluation of abnormal SPEP.    His past medical history is significant for type 2 diabetes, hypertension, vitamin D deficiency, vitamin B12 deficiency, folic acid deficiency.    He had serum protein electrophoresis drawn on 8/10/2024.  At that time he had a finding of M spike of 2.1.  Immunofixation identified this as IgG lambda.  Charting states that patient was referred to hematology in late 2024 but was noncompliant with being seen.    Last CBC was drawn on 2025.  WBC 6.5, hemoglobin 13.0, platelets 170. BUN 14, creatinine 0.72, GFR 95.  Total protein 8.2.  Calcium 8.7.    Patient states that he feels well.  He says that he works 13-hour  days most days per week.  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  He has excellent appetite.  No unexplained weight loss.  No nausea, vomiting, bowel changes, chest pain, shortness of breath.  No focal bone pain.    Review of Systems   Constitutional:  Negative for activity change, appetite change, fatigue and fever.   HENT:  Negative for nosebleeds.    Respiratory:  Negative for cough, choking and shortness of breath.    Cardiovascular:  Negative for chest pain, palpitations and leg swelling.   Gastrointestinal:  Negative for abdominal distention, abdominal pain, anal bleeding, blood in stool, constipation, diarrhea, nausea and vomiting.   Endocrine: Negative for cold intolerance.   Genitourinary:  Negative for hematuria.   Musculoskeletal:  Negative for myalgias.   Skin:  Negative for color change, pallor and rash.   Allergic/Immunologic: Negative for immunocompromised state.   Neurological:  Negative for headaches.   Hematological:  Negative for adenopathy. Does not bruise/bleed easily.   All other systems reviewed and are negative.     Objective   Vitals:    06/25/25 1323   BP: 156/86   Pulse: 71   Resp: 16   Temp: 98.6 °F (37 °C)   SpO2: 99%     Physical Exam  Constitutional:       General: He is not in acute distress.     Appearance: Normal appearance. He is well-developed.   HENT:      Head: Normocephalic and atraumatic.      Right Ear: External ear normal.      Left Ear: External ear normal.      Nose: Nose normal.     Eyes:      General: No scleral icterus.     Conjunctiva/sclera: Conjunctivae normal.       Cardiovascular:      Rate and Rhythm: Normal rate and regular rhythm.   Pulmonary:      Effort: Pulmonary effort is normal. No respiratory distress.      Breath sounds: Normal breath sounds.   Abdominal:      General: There is no distension.      Palpations: Abdomen is soft.      Tenderness: There is no abdominal tenderness.     Musculoskeletal:      Right lower leg: No edema.      Left lower leg:  No edema.     Skin:     Findings: No rash (on exposed skin.).     Neurological:      Mental Status: He is alert and oriented to person, place, and time.     Psychiatric:         Mood and Affect: Mood normal.         Thought Content: Thought content normal.         Judgment: Judgment normal.     Labs: I have reviewed the following labs:  Results for orders placed or performed in visit on 06/07/25   Microalbumin, Random Urine (W/Creatinine)   Result Value Ref Range    Creatinine, Urine 63 20 - 320 mg/dL    Albumin,U,Random 1.7 See Note: mg/dL    Microalb/Creat Ratio 27 <30 mg/g creat   Comprehensive metabolic panel   Result Value Ref Range    Glucose, Random 139 (H) 65 - 99 mg/dL    BUN 14 7 - 25 mg/dL    Creatinine 0.72 0.70 - 1.28 mg/dL    eGFR 95 > OR = 60 mL/min/1.73m2    SL AMB BUN/CREATININE RATIO SEE NOTE: 6 - 22 (calc)    Sodium 137 135 - 146 mmol/L    Potassium 4.3 3.5 - 5.3 mmol/L    Chloride 106 98 - 110 mmol/L    CO2 25 20 - 32 mmol/L    Calcium 8.7 8.6 - 10.3 mg/dL    Protein, Total 8.2 (H) 6.1 - 8.1 g/dL    Albumin 3.9 3.6 - 5.1 g/dL    Globulin 4.3 (H) 1.9 - 3.7 g/dL (calc)    Albumin/Globulin Ratio 0.9 (L) 1.0 - 2.5 (calc)    TOTAL BILIRUBIN 0.8 0.2 - 1.2 mg/dL    Alkaline Phosphatase 74 35 - 144 U/L    AST 13 10 - 35 U/L    ALT 7 (L) 9 - 46 U/L   CBC and differential   Result Value Ref Range    White Blood Cell Count 6.5 3.8 - 10.8 Thousand/uL    Red Blood Cell Count 4.89 4.20 - 5.80 Million/uL    Hemoglobin 13.0 (L) 13.2 - 17.1 g/dL    HCT 40.4 38.5 - 50.0 %    MCV 82.6 80.0 - 100.0 fL    MCH 26.6 (L) 27.0 - 33.0 pg    MCHC 32.2 32.0 - 36.0 g/dL    RDW 12.0 11.0 - 15.0 %    Platelet Count 170 140 - 400 Thousand/uL    SL AMB MPV 11.5 7.5 - 12.5 fL    Neutrophils (Absolute) 3,387 1,500 - 7,800 cells/uL    Lymphocytes (Absolute) 2,451 850 - 3,900 cells/uL    Monocytes (Absolute) 494 200 - 950 cells/uL    Eosinophils (Absolute) 150 15 - 500 cells/uL    Basophils ABS 20 0 - 200 cells/uL    Neutrophils  52.1 %    Lymphocytes 37.7 %    Monocytes 7.6 %    Eosinophils 2.3 %    Basophils PCT 0.3 %   Vitamin B12   Result Value Ref Range    Vitamin B-12 >2,000 (H) 200 - 1,100 pg/mL   PSA, total and free   Result Value Ref Range    Prostate Specific Antigen Total 6.4 (H) < OR = 4.0 ng/mL    PSA, Free 1.5 ng/mL    PSA, Free Pct 23 (L) >25 % (calc)   Hemoglobin A1c (w/out EAG)   Result Value Ref Range    Hemoglobin A1C 7.3 (H) <5.7 %

## 2025-06-28 LAB
ALBUMIN SERPL-MCNC: 3.8 G/DL (ref 3.6–5.1)
ALBUMIN/GLOB SERPL: 1.1 (CALC) (ref 1–2.5)
ALP SERPL-CCNC: 71 U/L (ref 35–144)
ALT SERPL-CCNC: 8 U/L (ref 9–46)
AST SERPL-CCNC: 13 U/L (ref 10–35)
B2 MICROGLOB SERPL-MCNC: 3.73 MG/L
BASOPHILS # BLD AUTO: 10 CELLS/UL (ref 0–200)
BASOPHILS NFR BLD AUTO: 0.2 %
BILIRUB SERPL-MCNC: 0.6 MG/DL (ref 0.2–1.2)
BUN SERPL-MCNC: 13 MG/DL (ref 7–25)
BUN/CREAT SERPL: ABNORMAL (CALC) (ref 6–22)
CALCIUM SERPL-MCNC: 8.7 MG/DL (ref 8.6–10.3)
CHLORIDE SERPL-SCNC: 103 MMOL/L (ref 98–110)
CO2 SERPL-SCNC: 27 MMOL/L (ref 20–32)
CREAT SERPL-MCNC: 0.8 MG/DL (ref 0.7–1.28)
EOSINOPHIL # BLD AUTO: 82 CELLS/UL (ref 15–500)
EOSINOPHIL NFR BLD AUTO: 1.7 %
ERYTHROCYTE [DISTWIDTH] IN BLOOD BY AUTOMATED COUNT: 12.2 % (ref 11–15)
GFR/BSA.PRED SERPLBLD CYS-BASED-ARV: 92 ML/MIN/1.73M2
GLOBULIN SER CALC-MCNC: 3.6 G/DL (CALC) (ref 1.9–3.7)
GLUCOSE SERPL-MCNC: 180 MG/DL (ref 65–139)
HCT VFR BLD AUTO: 37.3 % (ref 38.5–50)
HGB BLD-MCNC: 12.2 G/DL (ref 13.2–17.1)
IGA SERPL-MCNC: 37 MG/DL (ref 70–320)
IGG SERPL-MCNC: 2541 MG/DL (ref 600–1540)
IGM SERPL-MCNC: 14 MG/DL (ref 50–300)
LYMPHOCYTES # BLD AUTO: 1862 CELLS/UL (ref 850–3900)
LYMPHOCYTES NFR BLD AUTO: 38.8 %
MCH RBC QN AUTO: 27.5 PG (ref 27–33)
MCHC RBC AUTO-ENTMCNC: 32.7 G/DL (ref 32–36)
MCV RBC AUTO: 84 FL (ref 80–100)
MONOCYTES # BLD AUTO: 408 CELLS/UL (ref 200–950)
MONOCYTES NFR BLD AUTO: 8.5 %
NEUTROPHILS # BLD AUTO: 2438 CELLS/UL (ref 1500–7800)
NEUTROPHILS NFR BLD AUTO: 50.8 %
PLATELET # BLD AUTO: 154 THOUSAND/UL (ref 140–400)
PMV BLD REES-ECKER: 12.2 FL (ref 7.5–12.5)
POTASSIUM SERPL-SCNC: 3.9 MMOL/L (ref 3.5–5.3)
PROT SERPL-MCNC: 7.4 G/DL (ref 6.1–8.1)
PROT SERPL-MCNC: 7.4 G/DL (ref 6.1–8.1)
RBC # BLD AUTO: 4.44 MILLION/UL (ref 4.2–5.8)
SODIUM SERPL-SCNC: 135 MMOL/L (ref 135–146)
WBC # BLD AUTO: 4.8 THOUSAND/UL (ref 3.8–10.8)

## 2025-07-01 LAB
ALBUMIN SERPL ELPH-MCNC: 3.8 G/DL (ref 3.8–4.8)
ALBUMIN SERPL-MCNC: 3.8 G/DL (ref 3.6–5.1)
ALBUMIN/GLOB SERPL: 1.1 (CALC) (ref 1–2.5)
ALP SERPL-CCNC: 71 U/L (ref 35–144)
ALPHA1 GLOB SERPL ELPH-MCNC: 0.3 G/DL (ref 0.2–0.3)
ALPHA2 GLOB SERPL ELPH-MCNC: 0.5 G/DL (ref 0.5–0.9)
ALT SERPL-CCNC: 8 U/L (ref 9–46)
AST SERPL-CCNC: 13 U/L (ref 10–35)
B2 MICROGLOB SERPL-MCNC: 3.73 MG/L
BASOPHILS # BLD AUTO: 10 CELLS/UL (ref 0–200)
BASOPHILS NFR BLD AUTO: 0.2 %
BETA1 GLOB SERPL ELPH-MCNC: 0.3 G/DL (ref 0.4–0.6)
BETA2 GLOB SERPL ELPH-MCNC: 0.2 G/DL (ref 0.2–0.5)
BILIRUB SERPL-MCNC: 0.6 MG/DL (ref 0.2–1.2)
BUN SERPL-MCNC: 13 MG/DL (ref 7–25)
BUN/CREAT SERPL: ABNORMAL (CALC) (ref 6–22)
CALCIUM SERPL-MCNC: 8.7 MG/DL (ref 8.6–10.3)
CHLORIDE SERPL-SCNC: 103 MMOL/L (ref 98–110)
CO2 SERPL-SCNC: 27 MMOL/L (ref 20–32)
CREAT SERPL-MCNC: 0.8 MG/DL (ref 0.7–1.28)
EOSINOPHIL # BLD AUTO: 82 CELLS/UL (ref 15–500)
EOSINOPHIL NFR BLD AUTO: 1.7 %
ERYTHROCYTE [DISTWIDTH] IN BLOOD BY AUTOMATED COUNT: 12.2 % (ref 11–15)
GAMMA GLOB SERPL ELPH-MCNC: 2.3 G/DL (ref 0.8–1.7)
GFR/BSA.PRED SERPLBLD CYS-BASED-ARV: 92 ML/MIN/1.73M2
GLOBULIN SER CALC-MCNC: 3.6 G/DL (CALC) (ref 1.9–3.7)
GLUCOSE SERPL-MCNC: 180 MG/DL (ref 65–139)
HCT VFR BLD AUTO: 37.3 % (ref 38.5–50)
HGB BLD-MCNC: 12.2 G/DL (ref 13.2–17.1)
IGA SERPL-MCNC: 37 MG/DL (ref 70–320)
IGG SERPL-MCNC: 2541 MG/DL (ref 600–1540)
IGM SERPL-MCNC: 14 MG/DL (ref 50–300)
KAPPA LC FREE SER-MCNC: 16.1 MG/L (ref 3.3–19.4)
LYMPHOCYTES # BLD AUTO: 1862 CELLS/UL (ref 850–3900)
LYMPHOCYTES NFR BLD AUTO: 38.8 %
M PROTEIN 1 SERPL ELPH-MCNC: 2 G/DL
MCH RBC QN AUTO: 27.5 PG (ref 27–33)
MCHC RBC AUTO-ENTMCNC: 32.7 G/DL (ref 32–36)
MCV RBC AUTO: 84 FL (ref 80–100)
MONOCYTES # BLD AUTO: 408 CELLS/UL (ref 200–950)
MONOCYTES NFR BLD AUTO: 8.5 %
NEUTROPHILS # BLD AUTO: 2438 CELLS/UL (ref 1500–7800)
NEUTROPHILS NFR BLD AUTO: 50.8 %
PLATELET # BLD AUTO: 154 THOUSAND/UL (ref 140–400)
PMV BLD REES-ECKER: 12.2 FL (ref 7.5–12.5)
POTASSIUM SERPL-SCNC: 3.9 MMOL/L (ref 3.5–5.3)
PROT SERPL-MCNC: 7.4 G/DL (ref 6.1–8.1)
PROT SERPL-MCNC: 7.4 G/DL (ref 6.1–8.1)
RBC # BLD AUTO: 4.44 MILLION/UL (ref 4.2–5.8)
SODIUM SERPL-SCNC: 135 MMOL/L (ref 135–146)
WBC # BLD AUTO: 4.8 THOUSAND/UL (ref 3.8–10.8)

## 2025-07-14 ENCOUNTER — TELEPHONE (OUTPATIENT)
Dept: INTERNAL MEDICINE CLINIC | Facility: CLINIC | Age: 75
End: 2025-07-14

## 2025-07-14 NOTE — TELEPHONE ENCOUNTER
"Patient called the RX Refill Line. Message is being forwarded to the office.     Patient is requesting to have an xray of his back, stated that he fell back in February but he's still having pain. Stated that the pain is on his right shoulder and that it's worse when he is sitting down or at night when he's sleeping  Said that it's not gotten worse, it's just not improving   Pt stated he thinks \"he has a crack\" and would like an xray     Please contact patient at     "

## 2025-07-15 ENCOUNTER — OFFICE VISIT (OUTPATIENT)
Dept: INTERNAL MEDICINE CLINIC | Facility: CLINIC | Age: 75
End: 2025-07-15
Payer: COMMERCIAL

## 2025-07-15 VITALS
RESPIRATION RATE: 18 BRPM | WEIGHT: 151 LBS | BODY MASS INDEX: 22.88 KG/M2 | HEIGHT: 68 IN | SYSTOLIC BLOOD PRESSURE: 124 MMHG | HEART RATE: 78 BPM | OXYGEN SATURATION: 99 % | TEMPERATURE: 99.1 F | DIASTOLIC BLOOD PRESSURE: 80 MMHG

## 2025-07-15 DIAGNOSIS — R77.8 ABNORMAL SERUM PROTEIN ELECTROPHORESIS: ICD-10-CM

## 2025-07-15 DIAGNOSIS — I10 PRIMARY HYPERTENSION: ICD-10-CM

## 2025-07-15 DIAGNOSIS — G89.29 CHRONIC RIGHT SHOULDER PAIN: Primary | ICD-10-CM

## 2025-07-15 DIAGNOSIS — E11.9 TYPE 2 DIABETES MELLITUS WITHOUT COMPLICATION, WITHOUT LONG-TERM CURRENT USE OF INSULIN (HCC): ICD-10-CM

## 2025-07-15 DIAGNOSIS — R97.20 ELEVATED PSA: ICD-10-CM

## 2025-07-15 DIAGNOSIS — M25.511 CHRONIC RIGHT SHOULDER PAIN: Primary | ICD-10-CM

## 2025-07-15 DIAGNOSIS — E53.8 VITAMIN B12 DEFICIENCY: ICD-10-CM

## 2025-07-15 DIAGNOSIS — E55.9 VITAMIN D DEFICIENCY: ICD-10-CM

## 2025-07-15 DIAGNOSIS — M54.9 UPPER BACK PAIN ON RIGHT SIDE: ICD-10-CM

## 2025-07-15 DIAGNOSIS — D53.9 DEFICIENCY ANEMIA: ICD-10-CM

## 2025-07-15 DIAGNOSIS — K21.9 LARYNGOPHARYNGEAL REFLUX (LPR): ICD-10-CM

## 2025-07-15 PROCEDURE — 99214 OFFICE O/P EST MOD 30 MIN: CPT | Performed by: INTERNAL MEDICINE

## 2025-07-15 PROCEDURE — G2211 COMPLEX E/M VISIT ADD ON: HCPCS | Performed by: INTERNAL MEDICINE

## 2025-07-19 ENCOUNTER — HOSPITAL ENCOUNTER (OUTPATIENT)
Dept: RADIOLOGY | Facility: HOSPITAL | Age: 75
Discharge: HOME | End: 2025-07-19
Payer: COMMERCIAL

## 2025-07-19 DIAGNOSIS — M25.511 CHRONIC RIGHT SHOULDER PAIN: ICD-10-CM

## 2025-07-19 DIAGNOSIS — G89.29 CHRONIC RIGHT SHOULDER PAIN: ICD-10-CM

## 2025-07-19 DIAGNOSIS — M54.9 UPPER BACK PAIN ON RIGHT SIDE: ICD-10-CM

## 2025-07-19 PROCEDURE — 73030 X-RAY EXAM OF SHOULDER: CPT

## 2025-07-19 PROCEDURE — 72072 X-RAY EXAM THORAC SPINE 3VWS: CPT

## 2025-07-21 DIAGNOSIS — I10 PRIMARY HYPERTENSION: ICD-10-CM

## 2025-07-21 DIAGNOSIS — D53.9 DEFICIENCY ANEMIA: ICD-10-CM

## 2025-07-21 DIAGNOSIS — M25.511 CHRONIC RIGHT SHOULDER PAIN: ICD-10-CM

## 2025-07-21 DIAGNOSIS — E11.9 TYPE 2 DIABETES MELLITUS WITHOUT COMPLICATION, WITHOUT LONG-TERM CURRENT USE OF INSULIN (HCC): ICD-10-CM

## 2025-07-21 DIAGNOSIS — M54.9 UPPER BACK PAIN ON RIGHT SIDE: Primary | ICD-10-CM

## 2025-07-21 DIAGNOSIS — G89.29 CHRONIC RIGHT SHOULDER PAIN: ICD-10-CM

## 2025-07-22 ENCOUNTER — RESULTS FOLLOW-UP (OUTPATIENT)
Dept: INTERNAL MEDICINE CLINIC | Facility: CLINIC | Age: 75
End: 2025-07-22

## 2025-07-29 DIAGNOSIS — R77.8 ABNORMAL SERUM PROTEIN ELECTROPHORESIS: Primary | ICD-10-CM

## (undated) RX ORDER — BIMATOPROST 0.3 MG/ML
1 SOLUTION/ DROPS OPHTHALMIC EVERY EVENING
Start: 2024-11-20

## (undated) RX ORDER — BIMATOPROST 0.1 MG/ML
1 SOLUTION/ DROPS OPHTHALMIC EVERY EVENING
Start: 2021-10-25